# Patient Record
Sex: MALE | Race: WHITE | NOT HISPANIC OR LATINO
[De-identification: names, ages, dates, MRNs, and addresses within clinical notes are randomized per-mention and may not be internally consistent; named-entity substitution may affect disease eponyms.]

---

## 2019-10-01 ENCOUNTER — TRANSCRIPTION ENCOUNTER (OUTPATIENT)
Age: 79
End: 2019-10-01

## 2019-10-01 ENCOUNTER — INPATIENT (INPATIENT)
Facility: HOSPITAL | Age: 79
LOS: 7 days | Discharge: NURSING, REHAB OR LONG-TERM CARE | DRG: 467 | End: 2019-10-09
Attending: ORTHOPAEDIC SURGERY | Admitting: ORTHOPAEDIC SURGERY
Payer: MEDICARE

## 2019-10-01 VITALS
DIASTOLIC BLOOD PRESSURE: 81 MMHG | HEART RATE: 82 BPM | OXYGEN SATURATION: 98 % | RESPIRATION RATE: 17 BRPM | TEMPERATURE: 98 F | SYSTOLIC BLOOD PRESSURE: 121 MMHG

## 2019-10-01 DIAGNOSIS — S72.91XA UNSPECIFIED FRACTURE OF RIGHT FEMUR, INITIAL ENCOUNTER FOR CLOSED FRACTURE: ICD-10-CM

## 2019-10-01 LAB
ANION GAP SERPL CALC-SCNC: 10 MMOL/L — SIGNIFICANT CHANGE UP (ref 5–17)
APTT BLD: 27.3 SEC — LOW (ref 27.5–36.3)
BLD GP AB SCN SERPL QL: NEGATIVE — SIGNIFICANT CHANGE UP
BUN SERPL-MCNC: 18 MG/DL — SIGNIFICANT CHANGE UP (ref 7–23)
CALCIUM SERPL-MCNC: 9.4 MG/DL — SIGNIFICANT CHANGE UP (ref 8.4–10.5)
CHLORIDE SERPL-SCNC: 101 MMOL/L — SIGNIFICANT CHANGE UP (ref 96–108)
CO2 SERPL-SCNC: 26 MMOL/L — SIGNIFICANT CHANGE UP (ref 22–31)
CREAT SERPL-MCNC: 0.7 MG/DL — SIGNIFICANT CHANGE UP (ref 0.5–1.3)
GLUCOSE SERPL-MCNC: 102 MG/DL — HIGH (ref 70–99)
HCT VFR BLD CALC: 30.7 % — LOW (ref 39–50)
HGB BLD-MCNC: 10.5 G/DL — LOW (ref 13–17)
INR BLD: 1.04 RATIO — SIGNIFICANT CHANGE UP (ref 0.88–1.16)
MCHC RBC-ENTMCNC: 34 PG — SIGNIFICANT CHANGE UP (ref 27–34)
MCHC RBC-ENTMCNC: 34.2 GM/DL — SIGNIFICANT CHANGE UP (ref 32–36)
MCV RBC AUTO: 99.4 FL — SIGNIFICANT CHANGE UP (ref 80–100)
NRBC # BLD: 0 /100 WBCS — SIGNIFICANT CHANGE UP (ref 0–0)
PLATELET # BLD AUTO: 204 K/UL — SIGNIFICANT CHANGE UP (ref 150–400)
POTASSIUM SERPL-MCNC: 4.5 MMOL/L — SIGNIFICANT CHANGE UP (ref 3.5–5.3)
POTASSIUM SERPL-SCNC: 4.5 MMOL/L — SIGNIFICANT CHANGE UP (ref 3.5–5.3)
PROTHROM AB SERPL-ACNC: 11.9 SEC — SIGNIFICANT CHANGE UP (ref 10–12.9)
RBC # BLD: 3.09 M/UL — LOW (ref 4.2–5.8)
RBC # FLD: 13.4 % — SIGNIFICANT CHANGE UP (ref 10.3–14.5)
RH IG SCN BLD-IMP: POSITIVE — SIGNIFICANT CHANGE UP
SODIUM SERPL-SCNC: 137 MMOL/L — SIGNIFICANT CHANGE UP (ref 135–145)
WBC # BLD: 9.36 K/UL — SIGNIFICANT CHANGE UP (ref 3.8–10.5)
WBC # FLD AUTO: 9.36 K/UL — SIGNIFICANT CHANGE UP (ref 3.8–10.5)

## 2019-10-01 PROCEDURE — 76377 3D RENDER W/INTRP POSTPROCES: CPT | Mod: 26

## 2019-10-01 PROCEDURE — 73700 CT LOWER EXTREMITY W/O DYE: CPT | Mod: 26,LT

## 2019-10-01 PROCEDURE — 99221 1ST HOSP IP/OBS SF/LOW 40: CPT | Mod: AI,57

## 2019-10-01 PROCEDURE — 71045 X-RAY EXAM CHEST 1 VIEW: CPT | Mod: 26

## 2019-10-01 PROCEDURE — 93010 ELECTROCARDIOGRAM REPORT: CPT

## 2019-10-01 PROCEDURE — 73560 X-RAY EXAM OF KNEE 1 OR 2: CPT | Mod: 26,LT

## 2019-10-01 PROCEDURE — 73502 X-RAY EXAM HIP UNI 2-3 VIEWS: CPT | Mod: 26,LT

## 2019-10-01 PROCEDURE — 73552 X-RAY EXAM OF FEMUR 2/>: CPT | Mod: 26,LT

## 2019-10-01 RX ORDER — OXYCODONE HYDROCHLORIDE 5 MG/1
5 TABLET ORAL EVERY 4 HOURS
Refills: 0 | Status: DISCONTINUED | OUTPATIENT
Start: 2019-10-01 | End: 2019-10-02

## 2019-10-01 RX ORDER — ZOLPIDEM TARTRATE 10 MG/1
2.5 TABLET ORAL
Qty: 0 | Refills: 0 | DISCHARGE

## 2019-10-01 RX ORDER — ACETAMINOPHEN 500 MG
975 TABLET ORAL EVERY 8 HOURS
Refills: 0 | Status: DISCONTINUED | OUTPATIENT
Start: 2019-10-01 | End: 2019-10-02

## 2019-10-01 RX ORDER — SODIUM CHLORIDE 9 MG/ML
1000 INJECTION INTRAMUSCULAR; INTRAVENOUS; SUBCUTANEOUS
Refills: 0 | Status: DISCONTINUED | OUTPATIENT
Start: 2019-10-01 | End: 2019-10-02

## 2019-10-01 RX ORDER — SIMVASTATIN 20 MG/1
1 TABLET, FILM COATED ORAL
Qty: 0 | Refills: 0 | DISCHARGE

## 2019-10-01 RX ORDER — TRAMADOL HYDROCHLORIDE 50 MG/1
50 TABLET ORAL EVERY 6 HOURS
Refills: 0 | Status: DISCONTINUED | OUTPATIENT
Start: 2019-10-01 | End: 2019-10-02

## 2019-10-01 RX ORDER — SENNA PLUS 8.6 MG/1
2 TABLET ORAL AT BEDTIME
Refills: 0 | Status: DISCONTINUED | OUTPATIENT
Start: 2019-10-01 | End: 2019-10-02

## 2019-10-01 RX ORDER — LISINOPRIL 2.5 MG/1
1 TABLET ORAL
Qty: 0 | Refills: 0 | DISCHARGE

## 2019-10-01 RX ORDER — OXYCODONE HYDROCHLORIDE 5 MG/1
2.5 TABLET ORAL EVERY 4 HOURS
Refills: 0 | Status: DISCONTINUED | OUTPATIENT
Start: 2019-10-01 | End: 2019-10-02

## 2019-10-01 RX ADMIN — SENNA PLUS 2 TABLET(S): 8.6 TABLET ORAL at 22:27

## 2019-10-01 RX ADMIN — Medication 975 MILLIGRAM(S): at 22:57

## 2019-10-01 RX ADMIN — Medication 975 MILLIGRAM(S): at 22:27

## 2019-10-01 NOTE — CONSULT NOTE ADULT - SUBJECTIVE AND OBJECTIVE BOX
The patient was seen and examined tonight after an accidental fall with a left periprosthetic hip fracture that requires orthopedic ORIF. His comorbidities included inactive ASHD with stent 2010 and no post procedural symptoms, controlled hypercholesterolemia with medication, BPH ( possibly Ca. ) S/P cryosurgery 06/10/19, controlled hypertension, asbestosis, sleep apnea treated with CPAP and advanced age. Exam, lab, EKG and CXR are stable. The patient is medically stable, medically optimized and has no medical contraindication to surgery tomorrow as required. Exam time 70 minutes including > than 50 % for bedside discussion and counseling. Comprehensive consultation dictated #[id number].

## 2019-10-01 NOTE — H&P ADULT - HISTORY OF PRESENT ILLNESS
79y Male community ambulator w/o assistive devices presents as transfer from [facility] for periprosthetic femur fracture by L LENA. Patient fell off his roof onto cement floor, ~9 foot fall. Patient was unable to ambulate after fall. Went to urgent care on Friday 9/27 where xrays were negative. He was discharged to home. He subsequently fell again on 9/28 AM and remained bed bound until 10/1 when he presented by EMS to [facility]. Denies HS/LOC. Denies numbness/tingling. Denies fever/chills. Denies pain/injury elsewhere.     PAST MEDICAL & SURGICAL HISTORY:    MEDICATIONS  (STANDING):    Allergies      Intolerances        Vital Signs Last 24 Hrs  T(C): 36.7 (10-01-19 @ 18:03), Max: 36.7 (10-01-19 @ 18:03)  T(F): 98.1 (10-01-19 @ 18:03), Max: 98.1 (10-01-19 @ 18:03)  HR: 82 (10-01-19 @ 18:03) (82 - 82)  BP: 121/81 (10-01-19 @ 18:03) (121/81 - 121/81)  BP(mean): --  RR: 17 (10-01-19 @ 18:03) (17 - 17)  SpO2: 98% (10-01-19 @ 18:03) (98% - 98%)    Imaging: XR shows L [place] B2 fracture    Physical Exam  Gen: NAD  LLE: skin intact, ecchymosis over Left groin, short/ER leg, unable to SLR, + log roll, +ttp hip/groin, no ttp elsewhere, +ehl/fhl/ta/gs function, no calf ttp, dp/pt pulse intact, compartments soft  Secondary survey: benign, nv intact, able to SLR contralateral leg, negative log roll contralateral leg, no bony ttp elsewhere, bilateral upper extremity skin intact with no gross deformity, non tender to palpation over bony prominences, neurovascularly intact    A/P: 79y Male with L periprosthetic hip fracture  NPO after midnight, IVF  Pain control  NWB LLE, bedrest  FU labs/imaging/EKG/CXR  Needs Consent  Medical clearance/optimization for OR  Will discuss with attending

## 2019-10-02 ENCOUNTER — RESULT REVIEW (OUTPATIENT)
Age: 79
End: 2019-10-02

## 2019-10-02 LAB
ANION GAP SERPL CALC-SCNC: 7 MMOL/L — SIGNIFICANT CHANGE UP (ref 5–17)
ANION GAP SERPL CALC-SCNC: 9 MMOL/L — SIGNIFICANT CHANGE UP (ref 5–17)
APPEARANCE UR: CLEAR — SIGNIFICANT CHANGE UP
APTT BLD: 28.2 SEC — SIGNIFICANT CHANGE UP (ref 27.5–36.3)
BILIRUB UR-MCNC: NEGATIVE — SIGNIFICANT CHANGE UP
BLD GP AB SCN SERPL QL: NEGATIVE — SIGNIFICANT CHANGE UP
BUN SERPL-MCNC: 14 MG/DL — SIGNIFICANT CHANGE UP (ref 7–23)
BUN SERPL-MCNC: 18 MG/DL — SIGNIFICANT CHANGE UP (ref 7–23)
CALCIUM SERPL-MCNC: 8 MG/DL — LOW (ref 8.4–10.5)
CALCIUM SERPL-MCNC: 9.4 MG/DL — SIGNIFICANT CHANGE UP (ref 8.4–10.5)
CHLORIDE SERPL-SCNC: 100 MMOL/L — SIGNIFICANT CHANGE UP (ref 96–108)
CHLORIDE SERPL-SCNC: 101 MMOL/L — SIGNIFICANT CHANGE UP (ref 96–108)
CO2 SERPL-SCNC: 23 MMOL/L — SIGNIFICANT CHANGE UP (ref 22–31)
CO2 SERPL-SCNC: 28 MMOL/L — SIGNIFICANT CHANGE UP (ref 22–31)
COLOR SPEC: YELLOW — SIGNIFICANT CHANGE UP
CREAT SERPL-MCNC: 0.51 MG/DL — SIGNIFICANT CHANGE UP (ref 0.5–1.3)
CREAT SERPL-MCNC: 0.65 MG/DL — SIGNIFICANT CHANGE UP (ref 0.5–1.3)
DIFF PNL FLD: NEGATIVE — SIGNIFICANT CHANGE UP
GAS PNL BLDA: SIGNIFICANT CHANGE UP
GLUCOSE SERPL-MCNC: 106 MG/DL — HIGH (ref 70–99)
GLUCOSE SERPL-MCNC: 141 MG/DL — HIGH (ref 70–99)
GLUCOSE UR QL: NEGATIVE — SIGNIFICANT CHANGE UP
HCT VFR BLD CALC: 28.2 % — LOW (ref 39–50)
HCT VFR BLD CALC: 30.9 % — LOW (ref 39–50)
HGB BLD-MCNC: 10.7 G/DL — LOW (ref 13–17)
HGB BLD-MCNC: 9.8 G/DL — LOW (ref 13–17)
INR BLD: 1.06 RATIO — SIGNIFICANT CHANGE UP (ref 0.88–1.16)
KETONES UR-MCNC: NEGATIVE — SIGNIFICANT CHANGE UP
LEUKOCYTE ESTERASE UR-ACNC: NEGATIVE — SIGNIFICANT CHANGE UP
MCHC RBC-ENTMCNC: 34 PG — SIGNIFICANT CHANGE UP (ref 27–34)
MCHC RBC-ENTMCNC: 34.6 GM/DL — SIGNIFICANT CHANGE UP (ref 32–36)
MCHC RBC-ENTMCNC: 34.6 PG — HIGH (ref 27–34)
MCHC RBC-ENTMCNC: 34.8 GM/DL — SIGNIFICANT CHANGE UP (ref 32–36)
MCV RBC AUTO: 100 FL — SIGNIFICANT CHANGE UP (ref 80–100)
MCV RBC AUTO: 97.9 FL — SIGNIFICANT CHANGE UP (ref 80–100)
NITRITE UR-MCNC: NEGATIVE — SIGNIFICANT CHANGE UP
NRBC # BLD: 0 /100 WBCS — SIGNIFICANT CHANGE UP (ref 0–0)
NRBC # BLD: 0 /100 WBCS — SIGNIFICANT CHANGE UP (ref 0–0)
PH UR: 6 — SIGNIFICANT CHANGE UP (ref 5–8)
PLATELET # BLD AUTO: 194 K/UL — SIGNIFICANT CHANGE UP (ref 150–400)
PLATELET # BLD AUTO: 215 K/UL — SIGNIFICANT CHANGE UP (ref 150–400)
POTASSIUM SERPL-MCNC: 4.4 MMOL/L — SIGNIFICANT CHANGE UP (ref 3.5–5.3)
POTASSIUM SERPL-MCNC: 4.5 MMOL/L — SIGNIFICANT CHANGE UP (ref 3.5–5.3)
POTASSIUM SERPL-SCNC: 4.4 MMOL/L — SIGNIFICANT CHANGE UP (ref 3.5–5.3)
POTASSIUM SERPL-SCNC: 4.5 MMOL/L — SIGNIFICANT CHANGE UP (ref 3.5–5.3)
PROT UR-MCNC: NEGATIVE — SIGNIFICANT CHANGE UP
PROTHROM AB SERPL-ACNC: 12.1 SEC — SIGNIFICANT CHANGE UP (ref 10–12.9)
PSA FREE FLD-MCNC: <0.01 NG/ML — SIGNIFICANT CHANGE UP
PSA FREE FLD-MCNC: SIGNIFICANT CHANGE UP %
PSA SERPL-MCNC: 0.08 NG/ML — SIGNIFICANT CHANGE UP (ref 0–4)
RBC # BLD: 2.88 M/UL — LOW (ref 4.2–5.8)
RBC # BLD: 3.09 M/UL — LOW (ref 4.2–5.8)
RBC # FLD: 13.5 % — SIGNIFICANT CHANGE UP (ref 10.3–14.5)
RBC # FLD: 13.8 % — SIGNIFICANT CHANGE UP (ref 10.3–14.5)
RH IG SCN BLD-IMP: POSITIVE — SIGNIFICANT CHANGE UP
SODIUM SERPL-SCNC: 133 MMOL/L — LOW (ref 135–145)
SODIUM SERPL-SCNC: 135 MMOL/L — SIGNIFICANT CHANGE UP (ref 135–145)
SP GR SPEC: 1.02 — SIGNIFICANT CHANGE UP (ref 1.01–1.02)
UROBILINOGEN FLD QL: ABNORMAL
WBC # BLD: 16.12 K/UL — HIGH (ref 3.8–10.5)
WBC # BLD: 8.88 K/UL — SIGNIFICANT CHANGE UP (ref 3.8–10.5)
WBC # FLD AUTO: 16.12 K/UL — HIGH (ref 3.8–10.5)
WBC # FLD AUTO: 8.88 K/UL — SIGNIFICANT CHANGE UP (ref 3.8–10.5)

## 2019-10-02 PROCEDURE — 72170 X-RAY EXAM OF PELVIS: CPT | Mod: 26,59

## 2019-10-02 PROCEDURE — 27134 REVISE HIP JOINT REPLACEMENT: CPT | Mod: LT

## 2019-10-02 PROCEDURE — 27245 TREAT THIGH FRACTURE: CPT | Mod: LT

## 2019-10-02 PROCEDURE — 88300 SURGICAL PATH GROSS: CPT | Mod: 26

## 2019-10-02 PROCEDURE — 93010 ELECTROCARDIOGRAM REPORT: CPT | Mod: 76

## 2019-10-02 PROCEDURE — 73503 X-RAY EXAM HIP UNI 4/> VIEWS: CPT | Mod: 26,LT

## 2019-10-02 PROCEDURE — 73551 X-RAY EXAM OF FEMUR 1: CPT | Mod: 26,LT

## 2019-10-02 RX ORDER — ASPIRIN/CALCIUM CARB/MAGNESIUM 324 MG
325 TABLET ORAL
Refills: 0 | Status: DISCONTINUED | OUTPATIENT
Start: 2019-10-02 | End: 2019-10-09

## 2019-10-02 RX ORDER — KETOROLAC TROMETHAMINE 30 MG/ML
15 SYRINGE (ML) INJECTION EVERY 8 HOURS
Refills: 0 | Status: DISCONTINUED | OUTPATIENT
Start: 2019-10-02 | End: 2019-10-04

## 2019-10-02 RX ORDER — SODIUM CHLORIDE 9 MG/ML
1000 INJECTION, SOLUTION INTRAVENOUS
Refills: 0 | Status: DISCONTINUED | OUTPATIENT
Start: 2019-10-02 | End: 2019-10-09

## 2019-10-02 RX ORDER — HYDROMORPHONE HYDROCHLORIDE 2 MG/ML
0.5 INJECTION INTRAMUSCULAR; INTRAVENOUS; SUBCUTANEOUS
Refills: 0 | Status: DISCONTINUED | OUTPATIENT
Start: 2019-10-02 | End: 2019-10-02

## 2019-10-02 RX ORDER — SENNA PLUS 8.6 MG/1
2 TABLET ORAL AT BEDTIME
Refills: 0 | Status: DISCONTINUED | OUTPATIENT
Start: 2019-10-02 | End: 2019-10-09

## 2019-10-02 RX ORDER — DIPHENHYDRAMINE HCL 50 MG
25 CAPSULE ORAL EVERY 4 HOURS
Refills: 0 | Status: DISCONTINUED | OUTPATIENT
Start: 2019-10-02 | End: 2019-10-09

## 2019-10-02 RX ORDER — SODIUM CHLORIDE 9 MG/ML
500 INJECTION INTRAMUSCULAR; INTRAVENOUS; SUBCUTANEOUS ONCE
Refills: 0 | Status: COMPLETED | OUTPATIENT
Start: 2019-10-02 | End: 2019-10-02

## 2019-10-02 RX ORDER — DOCUSATE SODIUM 100 MG
100 CAPSULE ORAL THREE TIMES A DAY
Refills: 0 | Status: DISCONTINUED | OUTPATIENT
Start: 2019-10-02 | End: 2019-10-09

## 2019-10-02 RX ORDER — SIMVASTATIN 20 MG/1
40 TABLET, FILM COATED ORAL AT BEDTIME
Refills: 0 | Status: DISCONTINUED | OUTPATIENT
Start: 2019-10-02 | End: 2019-10-09

## 2019-10-02 RX ORDER — ONDANSETRON 8 MG/1
4 TABLET, FILM COATED ORAL ONCE
Refills: 0 | Status: DISCONTINUED | OUTPATIENT
Start: 2019-10-02 | End: 2019-10-02

## 2019-10-02 RX ORDER — OXYCODONE HYDROCHLORIDE 5 MG/1
5 TABLET ORAL EVERY 4 HOURS
Refills: 0 | Status: DISCONTINUED | OUTPATIENT
Start: 2019-10-02 | End: 2019-10-09

## 2019-10-02 RX ORDER — POLYETHYLENE GLYCOL 3350 17 G/17G
17 POWDER, FOR SOLUTION ORAL DAILY
Refills: 0 | Status: DISCONTINUED | OUTPATIENT
Start: 2019-10-02 | End: 2019-10-09

## 2019-10-02 RX ORDER — ASCORBIC ACID 60 MG
500 TABLET,CHEWABLE ORAL
Refills: 0 | Status: DISCONTINUED | OUTPATIENT
Start: 2019-10-02 | End: 2019-10-09

## 2019-10-02 RX ORDER — MAGNESIUM HYDROXIDE 400 MG/1
30 TABLET, CHEWABLE ORAL DAILY
Refills: 0 | Status: DISCONTINUED | OUTPATIENT
Start: 2019-10-02 | End: 2019-10-09

## 2019-10-02 RX ORDER — TRAMADOL HYDROCHLORIDE 50 MG/1
50 TABLET ORAL EVERY 6 HOURS
Refills: 0 | Status: DISCONTINUED | OUTPATIENT
Start: 2019-10-02 | End: 2019-10-07

## 2019-10-02 RX ORDER — LISINOPRIL 2.5 MG/1
10 TABLET ORAL DAILY
Refills: 0 | Status: DISCONTINUED | OUTPATIENT
Start: 2019-10-02 | End: 2019-10-09

## 2019-10-02 RX ORDER — FOLIC ACID 0.8 MG
1 TABLET ORAL DAILY
Refills: 0 | Status: DISCONTINUED | OUTPATIENT
Start: 2019-10-02 | End: 2019-10-09

## 2019-10-02 RX ORDER — FERROUS SULFATE 325(65) MG
325 TABLET ORAL
Refills: 0 | Status: DISCONTINUED | OUTPATIENT
Start: 2019-10-02 | End: 2019-10-09

## 2019-10-02 RX ORDER — ACETAMINOPHEN 500 MG
650 TABLET ORAL EVERY 6 HOURS
Refills: 0 | Status: COMPLETED | OUTPATIENT
Start: 2019-10-02 | End: 2019-10-05

## 2019-10-02 RX ORDER — PANTOPRAZOLE SODIUM 20 MG/1
40 TABLET, DELAYED RELEASE ORAL
Refills: 0 | Status: DISCONTINUED | OUTPATIENT
Start: 2019-10-02 | End: 2019-10-09

## 2019-10-02 RX ORDER — CEFAZOLIN SODIUM 1 G
2000 VIAL (EA) INJECTION EVERY 8 HOURS
Refills: 0 | Status: COMPLETED | OUTPATIENT
Start: 2019-10-02 | End: 2019-10-03

## 2019-10-02 RX ORDER — ONDANSETRON 8 MG/1
4 TABLET, FILM COATED ORAL EVERY 6 HOURS
Refills: 0 | Status: DISCONTINUED | OUTPATIENT
Start: 2019-10-02 | End: 2019-10-09

## 2019-10-02 RX ORDER — SODIUM CHLORIDE 9 MG/ML
500 INJECTION INTRAMUSCULAR; INTRAVENOUS; SUBCUTANEOUS ONCE
Refills: 0 | Status: COMPLETED | OUTPATIENT
Start: 2019-10-03 | End: 2019-10-03

## 2019-10-02 RX ORDER — OXYCODONE HYDROCHLORIDE 5 MG/1
10 TABLET ORAL EVERY 4 HOURS
Refills: 0 | Status: DISCONTINUED | OUTPATIENT
Start: 2019-10-02 | End: 2019-10-08

## 2019-10-02 RX ADMIN — HYDROMORPHONE HYDROCHLORIDE 0.5 MILLIGRAM(S): 2 INJECTION INTRAMUSCULAR; INTRAVENOUS; SUBCUTANEOUS at 21:30

## 2019-10-02 RX ADMIN — HYDROMORPHONE HYDROCHLORIDE 0.5 MILLIGRAM(S): 2 INJECTION INTRAMUSCULAR; INTRAVENOUS; SUBCUTANEOUS at 21:15

## 2019-10-02 RX ADMIN — Medication 975 MILLIGRAM(S): at 06:24

## 2019-10-02 RX ADMIN — OXYCODONE HYDROCHLORIDE 10 MILLIGRAM(S): 5 TABLET ORAL at 23:56

## 2019-10-02 RX ADMIN — Medication 650 MILLIGRAM(S): at 23:56

## 2019-10-02 RX ADMIN — OXYCODONE HYDROCHLORIDE 10 MILLIGRAM(S): 5 TABLET ORAL at 23:26

## 2019-10-02 RX ADMIN — SODIUM CHLORIDE 125 MILLILITER(S): 9 INJECTION INTRAMUSCULAR; INTRAVENOUS; SUBCUTANEOUS at 12:14

## 2019-10-02 RX ADMIN — Medication 15 MILLIGRAM(S): at 22:00

## 2019-10-02 RX ADMIN — SODIUM CHLORIDE 75 MILLILITER(S): 9 INJECTION, SOLUTION INTRAVENOUS at 21:52

## 2019-10-02 RX ADMIN — Medication 975 MILLIGRAM(S): at 05:54

## 2019-10-02 RX ADMIN — Medication 650 MILLIGRAM(S): at 23:26

## 2019-10-02 RX ADMIN — HYDROMORPHONE HYDROCHLORIDE 0.5 MILLIGRAM(S): 2 INJECTION INTRAMUSCULAR; INTRAVENOUS; SUBCUTANEOUS at 20:55

## 2019-10-02 RX ADMIN — Medication 100 MILLIGRAM(S): at 22:01

## 2019-10-02 RX ADMIN — Medication 15 MILLIGRAM(S): at 22:15

## 2019-10-02 RX ADMIN — SODIUM CHLORIDE 1000 MILLILITER(S): 9 INJECTION INTRAMUSCULAR; INTRAVENOUS; SUBCUTANEOUS at 21:45

## 2019-10-02 NOTE — PROGRESS NOTE ADULT - SUBJECTIVE AND OBJECTIVE BOX
Subjective: Patient seen and examined at bedside. Reports no acute complaints at this time. Pain is well controlled.    Objective:    Vital Signs Last 24 Hrs  T(C): 36.9 (02 Oct 2019 00:39), Max: 36.9 (02 Oct 2019 00:39)  T(F): 98.4 (02 Oct 2019 00:39), Max: 98.4 (02 Oct 2019 00:39)  HR: 69 (02 Oct 2019 00:39) (69 - 82)  BP: 133/78 (02 Oct 2019 00:39) (121/81 - 133/78)  BP(mean): --  RR: 17 (02 Oct 2019 00:39) (17 - 17)  SpO2: 98% (02 Oct 2019 00:39) (98% - 98%)    10-01    137  |  101  |  18  ----------------------------<  102<H>  4.5   |  26  |  0.70    Ca    9.4      01 Oct 2019 19:28                              10.7   8.88  )-----------( 215      ( 02 Oct 2019 05:37 )             30.9       PHYSICAL EXAM:  Gen: NAD   Left Lower Extremity:  +EHL/FHL/TA/GS  SILT L3-S1  +DP/PT Pulses

## 2019-10-02 NOTE — CHART NOTE - NSCHARTNOTEFT_GEN_A_CORE
Resting without complaints, nurses state he was agitated immediately postop.  No Chest Pain, SOB, N/V.    T(C): 36.7 (10-02-19 @ 22:00), Max: 36.9 (10-02-19 @ 00:39)  HR: 74 (10-02-19 @ 21:45) (68 - 93)  BP: 115/73 (10-02-19 @ 21:45) (103/78 - 133/78)  RR: 16 (10-02-19 @ 21:45) (14 - 18)  SpO2: 99% (10-02-19 @ 21:45) (96% - 100%)    Exam:  A&O x3 but rambling, no acute distress  Laterality: LLE Aquacel in place, C/D/I, L thigh ecchymotic anteriorly extending to the groin   Calves soft, non-tender bilaterally  +PF/DF/EHL/FHL  SILT  + DP pulse    Xray: in chart                          10.7   8.88  )-----------( 215      ( 02 Oct 2019 05:37 )             30.9    10-02    135  |  100  |  18  ----------------------------<  106<H>  4.4   |  28  |  0.65    Ca    9.4      02 Oct 2019 05:37        A/P: 79yMale S/p  L revision LENA. VSS. NAD    -PT/OT-WBAT LLE With Anterior Hip Precautions, OOB in AM  -IS encouraged  -DVT PPx with  mg BID  -Followup AM labs  -Pain Control  -PACU to floor    Doreen Servin PA-C  Team Pager #[phone] Resting without complaints, nurses state he was agitated immediately postop.  No Chest Pain, SOB, N/V.    T(C): 36.7 (10-02-19 @ 22:00), Max: 36.9 (10-02-19 @ 00:39)  HR: 74 (10-02-19 @ 21:45) (68 - 93)  BP: 115/73 (10-02-19 @ 21:45) (103/78 - 133/78)  RR: 16 (10-02-19 @ 21:45) (14 - 18)  SpO2: 99% (10-02-19 @ 21:45) (96% - 100%)    Exam:  A&O x3 but rambling, no acute distress  Laterality: LLE Aquacel in place, C/D/I, L thigh ecchymotic anteriorly extending to the groin   Calves soft, non-tender bilaterally  +PF/DF/EHL/FHL  SILT  + DP pulse    Xray: in chart                          10.7   8.88  )-----------( 215      ( 02 Oct 2019 05:37 )             30.9    10-02    135  |  100  |  18  ----------------------------<  106<H>  4.4   |  28  |  0.65    Ca    9.4      02 Oct 2019 05:37        A/P: 79yMale S/p  L revision LENA. VSS. NAD    -PT/OT-WBAT LLE with posterior hip precautions, OOB in AM  -IS encouraged  -DVT PPx with  mg BID  -Followup AM labs  -Pain Control  -PACU to floor    Doreen Servin PA-C  Team Pager #[phone] Resting without complaints, nurses state he was agitated immediately postop.  No Chest Pain, SOB, N/V.    T(C): 36.7 (10-02-19 @ 22:00), Max: 36.9 (10-02-19 @ 00:39)  HR: 74 (10-02-19 @ 21:45) (68 - 93)  BP: 115/73 (10-02-19 @ 21:45) (103/78 - 133/78)  RR: 16 (10-02-19 @ 21:45) (14 - 18)  SpO2: 99% (10-02-19 @ 21:45) (96% - 100%)    Exam:  A&O x3 but rambling, no acute distress  Laterality: LLE Aquacel in place, C/D/I, L thigh ecchymotic anteriorly extending to the groin   Calves soft, non-tender bilaterally  +PF/DF/EHL/FHL  SILT  + DP pulse    Xray: in chart                          10.7   8.88  )-----------( 215      ( 02 Oct 2019 05:37 )             30.9    10-02    135  |  100  |  18  ----------------------------<  106<H>  4.4   |  28  |  0.65    Ca    9.4      02 Oct 2019 05:37        A/P: 79yMale S/p  L revision LENA. VSS. NAD    -PT/OT-TTWB LLE with posterior hip precautions, OOB in AM  -IS encouraged  -DVT PPx with  mg BID  -Followup AM labs  -Pain Control  -PACU to floor    Doreen Servin PA-C  Team Pager #[phone]

## 2019-10-02 NOTE — PROGRESS NOTE ADULT - ASSESSMENT
A/P: 79y Male with L periprosthetic hip fracture  OR today at 4:30 with Dr. Corral  NPO   Pain control  NWB LLE, bedrest  FU labs/imaging/EKG/CXR  Medical clearance/optimization for OR pending

## 2019-10-02 NOTE — PROGRESS NOTE ADULT - SUBJECTIVE AND OBJECTIVE BOX
The patient was seen and examined tonight after an accidental fall with a left periprosthetic hip fracture that requires orthopedic ORIF. His comorbidities included inactive ASHD with stent  and no post procedural symptoms, controlled hypercholesterolemia with medication, BPH ( possibly Ca. ) S/P cryosurgery 06/10/19, controlled hypertension, asbestosis, sleep apnea treated with CPAPPatient is a 79y old  Male who presents with a chief complaint of Left periprosthetic hip fracture (02 Oct 2019 06:05)        MEDICATIONS  (STANDING):  acetaminophen   Tablet .. 650 milliGRAM(s) Oral every 6 hours  ascorbic acid 500 milliGRAM(s) Oral two times a day  aspirin enteric coated 325 milliGRAM(s) Oral two times a day  calcium carbonate 1250 mG  + Vitamin D (OsCal 500 + D) 1 Tablet(s) Oral three times a day  ceFAZolin   IVPB 2000 milliGRAM(s) IV Intermittent every 8 hours  docusate sodium 100 milliGRAM(s) Oral three times a day  ferrous    sulfate 325 milliGRAM(s) Oral three times a day with meals  folic acid 1 milliGRAM(s) Oral daily  ketorolac   Injectable 15 milliGRAM(s) IV Push every 8 hours  lactated ringers. 1000 milliLiter(s) (75 mL/Hr) IV Continuous <Continuous>  lisinopril 10 milliGRAM(s) Oral daily  multivitamin 1 Tablet(s) Oral daily  pantoprazole    Tablet 40 milliGRAM(s) Oral before breakfast  polyethylene glycol 3350 17 Gram(s) Oral daily  simvastatin 40 milliGRAM(s) Oral at bedtime  sodium chloride 0.9% Bolus 500 milliLiter(s) IV Bolus once    MEDICATIONS  (PRN):  aluminum hydroxide/magnesium hydroxide/simethicone Suspension 30 milliLiter(s) Oral four times a day PRN Indigestion  diphenhydrAMINE 25 milliGRAM(s) Oral every 4 hours PRN Rash and/or Itching  LORazepam   Injectable 1 milliGRAM(s) IV Push every 2 hours PRN CIWA-Ar score increase by 2 points and a total score of 7 or less  LORazepam   Injectable 1 milliGRAM(s) IV Push every 1 hour PRN CIWA-Ar score 8 or greater  LORazepam   Injectable 2 milliGRAM(s) IV Push every 2 hours PRN Symptom-triggered: 2 point increase in CIWA -Ar score and a total score of 7 or LESS  magnesium hydroxide Suspension 30 milliLiter(s) Oral daily PRN Constipation  ondansetron Injectable 4 milliGRAM(s) IV Push every 6 hours PRN Nausea and/or Vomiting  oxyCODONE    IR 5 milliGRAM(s) Oral every 4 hours PRN Moderate Pain (4 - 6)  oxyCODONE    IR 10 milliGRAM(s) Oral every 4 hours PRN Severe Pain (7 - 10)  senna 2 Tablet(s) Oral at bedtime PRN Constipation  traMADol 50 milliGRAM(s) Oral every 6 hours PRN Mild Pain (1 - 3)          VITALS:   T(C): 36.8 (10-02-19 @ 23:20), Max: 36.9 (10-02-19 @ 00:39)  HR: 75 (10-02-19 @ 23:20) (68 - 93)  BP: 121/69 (10-02-19 @ 23:20) (103/78 - 133/78)  RR: 18 (10-02-19 @ 23:20) (14 - 18)  SpO2: 96% (10-02-19 @ 23:20) (95% - 100%)  Wt(kg): --        LABS:  ABG - ( 02 Oct 2019 19:09 )  pH, Arterial: 7.37  pH, Blood: x     /  pCO2: 44    /  pO2: 181   / HCO3: 25    / Base Excess: -.3   /  SaO2: 99                    CBC Full  -  ( 02 Oct 2019 21:51 )  WBC Count : 16.12 K/uL  RBC Count : 2.88 M/uL  Hemoglobin : 9.8 g/dL  Hematocrit : 28.2 %  Platelet Count - Automated : 194 K/uL  Mean Cell Volume : 97.9 fl  Mean Cell Hemoglobin : 34.0 pg  Mean Cell Hemoglobin Concentration : 34.8 gm/dL  Auto Neutrophil # : x  Auto Lymphocyte # : x  Auto Monocyte # : x  Auto Eosinophil # : x  Auto Basophil # : x  Auto Neutrophil % : x  Auto Lymphocyte % : x  Auto Monocyte % : x  Auto Eosinophil % : x  Auto Basophil % : x    10-02    133<L>  |  101  |  14  ----------------------------<  141<H>  4.5   |  23  |  0.51    Ca    8.0<L>      02 Oct 2019 21:51        PT/INR - ( 02 Oct 2019 05:37 )   PT: 12.1 sec;   INR: 1.06 ratio         PTT - ( 02 Oct 2019 05:37 )  PTT:28.2 sec  Urinalysis Basic - ( 02 Oct 2019 00:16 )    Color: Yellow / Appearance: Clear / S.021 / pH: x  Gluc: x / Ketone: Negative  / Bili: Negative / Urobili: 12 mg/dL   Blood: x / Protein: Negative / Nitrite: Negative   Leuk Esterase: Negative / RBC: x / WBC x   Sq Epi: x / Non Sq Epi: x / Bacteria: x      CAPILLARY BLOOD GLUCOSE          RADIOLOGY & ADDITIONAL TESTS:

## 2019-10-02 NOTE — PROGRESS NOTE ADULT - ASSESSMENT
&* yo male presents after a fall. Found to have a right periprosthetic femur fx. Patient awaiting OR for Open reduction and internal fixation

## 2019-10-02 NOTE — PACU DISCHARGE NOTE - COMMENTS
PACU criteria met. DC to floor. care per primary team PACU criteria met. DC to floor. care per primary team. Continuos pulse ox, supplemental O2 PRN - patient refusing CPAP

## 2019-10-02 NOTE — BRIEF OPERATIVE NOTE - NSICDXBRIEFPROCEDURE_GEN_ALL_CORE_FT
PROCEDURES:  Open reduction and internal fixation of femur 02-Oct-2019 20:20:00  Goldy Trivedi  Major revision, total hip arthroplasty 02-Oct-2019 20:19:40  Goldy Trivedi

## 2019-10-03 DIAGNOSIS — N40.0 BENIGN PROSTATIC HYPERPLASIA WITHOUT LOWER URINARY TRACT SYMPTOMS: ICD-10-CM

## 2019-10-03 DIAGNOSIS — S72.92XA UNSPECIFIED FRACTURE OF LEFT FEMUR, INITIAL ENCOUNTER FOR CLOSED FRACTURE: ICD-10-CM

## 2019-10-03 DIAGNOSIS — I10 ESSENTIAL (PRIMARY) HYPERTENSION: ICD-10-CM

## 2019-10-03 LAB
ANION GAP SERPL CALC-SCNC: 9 MMOL/L — SIGNIFICANT CHANGE UP (ref 5–17)
BUN SERPL-MCNC: 15 MG/DL — SIGNIFICANT CHANGE UP (ref 7–23)
CALCIUM SERPL-MCNC: 8.4 MG/DL — SIGNIFICANT CHANGE UP (ref 8.4–10.5)
CHLORIDE SERPL-SCNC: 99 MMOL/L — SIGNIFICANT CHANGE UP (ref 96–108)
CO2 SERPL-SCNC: 24 MMOL/L — SIGNIFICANT CHANGE UP (ref 22–31)
CREAT SERPL-MCNC: 0.61 MG/DL — SIGNIFICANT CHANGE UP (ref 0.5–1.3)
GLUCOSE SERPL-MCNC: 128 MG/DL — HIGH (ref 70–99)
HCT VFR BLD CALC: 26.9 % — LOW (ref 39–50)
HGB BLD-MCNC: 8.7 G/DL — LOW (ref 13–17)
MCHC RBC-ENTMCNC: 32.3 GM/DL — SIGNIFICANT CHANGE UP (ref 32–36)
MCHC RBC-ENTMCNC: 32.8 PG — SIGNIFICANT CHANGE UP (ref 27–34)
MCV RBC AUTO: 101.5 FL — HIGH (ref 80–100)
PLATELET # BLD AUTO: 211 K/UL — SIGNIFICANT CHANGE UP (ref 150–400)
POTASSIUM SERPL-MCNC: 4.8 MMOL/L — SIGNIFICANT CHANGE UP (ref 3.5–5.3)
POTASSIUM SERPL-SCNC: 4.8 MMOL/L — SIGNIFICANT CHANGE UP (ref 3.5–5.3)
RBC # BLD: 2.65 M/UL — LOW (ref 4.2–5.8)
RBC # FLD: 14 % — SIGNIFICANT CHANGE UP (ref 10.3–14.5)
SODIUM SERPL-SCNC: 132 MMOL/L — LOW (ref 135–145)
WBC # BLD: 12.3 K/UL — HIGH (ref 3.8–10.5)
WBC # FLD AUTO: 12.3 K/UL — HIGH (ref 3.8–10.5)

## 2019-10-03 RX ADMIN — Medication 100 MILLIGRAM(S): at 21:51

## 2019-10-03 RX ADMIN — Medication 1 TABLET(S): at 14:36

## 2019-10-03 RX ADMIN — Medication 1 TABLET(S): at 21:50

## 2019-10-03 RX ADMIN — Medication 1 TABLET(S): at 05:56

## 2019-10-03 RX ADMIN — OXYCODONE HYDROCHLORIDE 10 MILLIGRAM(S): 5 TABLET ORAL at 22:21

## 2019-10-03 RX ADMIN — Medication 650 MILLIGRAM(S): at 05:55

## 2019-10-03 RX ADMIN — SODIUM CHLORIDE 1000 MILLILITER(S): 9 INJECTION INTRAMUSCULAR; INTRAVENOUS; SUBCUTANEOUS at 05:55

## 2019-10-03 RX ADMIN — Medication 15 MILLIGRAM(S): at 15:06

## 2019-10-03 RX ADMIN — Medication 650 MILLIGRAM(S): at 11:46

## 2019-10-03 RX ADMIN — Medication 325 MILLIGRAM(S): at 09:59

## 2019-10-03 RX ADMIN — Medication 15 MILLIGRAM(S): at 21:50

## 2019-10-03 RX ADMIN — Medication 500 MILLIGRAM(S): at 05:55

## 2019-10-03 RX ADMIN — Medication 100 MILLIGRAM(S): at 02:29

## 2019-10-03 RX ADMIN — OXYCODONE HYDROCHLORIDE 10 MILLIGRAM(S): 5 TABLET ORAL at 05:56

## 2019-10-03 RX ADMIN — Medication 650 MILLIGRAM(S): at 18:00

## 2019-10-03 RX ADMIN — Medication 15 MILLIGRAM(S): at 14:36

## 2019-10-03 RX ADMIN — Medication 100 MILLIGRAM(S): at 05:55

## 2019-10-03 RX ADMIN — Medication 1 MILLIGRAM(S): at 11:46

## 2019-10-03 RX ADMIN — Medication 325 MILLIGRAM(S): at 11:46

## 2019-10-03 RX ADMIN — Medication 15 MILLIGRAM(S): at 22:21

## 2019-10-03 RX ADMIN — SIMVASTATIN 40 MILLIGRAM(S): 20 TABLET, FILM COATED ORAL at 21:50

## 2019-10-03 RX ADMIN — OXYCODONE HYDROCHLORIDE 10 MILLIGRAM(S): 5 TABLET ORAL at 21:51

## 2019-10-03 RX ADMIN — Medication 15 MILLIGRAM(S): at 05:55

## 2019-10-03 RX ADMIN — Medication 650 MILLIGRAM(S): at 12:15

## 2019-10-03 RX ADMIN — Medication 500 MILLIGRAM(S): at 17:33

## 2019-10-03 RX ADMIN — Medication 100 MILLIGRAM(S): at 14:36

## 2019-10-03 RX ADMIN — POLYETHYLENE GLYCOL 3350 17 GRAM(S): 17 POWDER, FOR SOLUTION ORAL at 11:46

## 2019-10-03 RX ADMIN — PANTOPRAZOLE SODIUM 40 MILLIGRAM(S): 20 TABLET, DELAYED RELEASE ORAL at 05:56

## 2019-10-03 RX ADMIN — Medication 1 TABLET(S): at 11:46

## 2019-10-03 RX ADMIN — Medication 325 MILLIGRAM(S): at 05:55

## 2019-10-03 RX ADMIN — LISINOPRIL 10 MILLIGRAM(S): 2.5 TABLET ORAL at 05:56

## 2019-10-03 RX ADMIN — Medication 650 MILLIGRAM(S): at 17:33

## 2019-10-03 RX ADMIN — Medication 325 MILLIGRAM(S): at 17:33

## 2019-10-03 RX ADMIN — Medication 100 MILLIGRAM(S): at 09:59

## 2019-10-03 NOTE — PHYSICAL THERAPY INITIAL EVALUATION ADULT - PERTINENT HX OF CURRENT PROBLEM, REHAB EVAL
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  pt with difficulty weightbearing afterwards went to an urgent care and was sent home.  a few days later pt went to [facility] for ongoing hip pain. found to have a periprosthetic fx.  pt transferred to [facility] for management.  Pt is pod 1 s/p revision of R THR

## 2019-10-03 NOTE — PHYSICAL THERAPY INITIAL EVALUATION ADULT - ADDITIONAL COMMENTS
pt lives in a private home in [place] and he owns a vacation home in [place].  pt's home in [place] has a ramp. pt has a w/c, RW

## 2019-10-03 NOTE — PROVIDER CONTACT NOTE (OTHER) - ASSESSMENT
Pt resting in bed at this time. Spouse at bedside. Medicated for pain. Bryson catheter to bedside drainage with good output. Order states to remove Bryson within 24 hrs, PO day #1. Patient unable to work with PT yesterday post sx.

## 2019-10-03 NOTE — PHYSICAL THERAPY INITIAL EVALUATION ADULT - BALANCE TRAINING, PT EVAL
GOAL: Pt will improve balance during (static&dynamic) (sitting&standing) activites by atleast 1 balance grade within 3-4 weeks to assist with greater independence during functional mobility and ADL's.

## 2019-10-03 NOTE — PROGRESS NOTE ADULT - SUBJECTIVE AND OBJECTIVE BOX
Patient seen and examined. Pain controlled. No acute events overnight. on ciwa protocol.                            9.8    16.12 )-----------( 194      ( 02 Oct 2019 21:51 )             28.2     02 Oct 2019 21:51    133    |  101    |  14     ----------------------------<  141    4.5     |  23     |  0.51     Ca    8.0        02 Oct 2019 21:51      PT/INR - ( 02 Oct 2019 05:37 )   PT: 12.1 sec;   INR: 1.06 ratio         PTT - ( 02 Oct 2019 05:37 )  PTT:28.2 sec  Vital Signs Last 24 Hrs  T(C): 36.9 (10-03-19 @ 04:33), Max: 36.9 (10-03-19 @ 01:20)  T(F): 98.4 (10-03-19 @ 04:33), Max: 98.4 (10-03-19 @ 01:20)  HR: 72 (10-03-19 @ 05:54) (68 - 93)  BP: 117/66 (10-03-19 @ 05:54) (103/78 - 132/86)  BP(mean): 104 (10-02-19 @ 22:50) (75 - 105)  RR: 18 (10-03-19 @ 05:54) (14 - 18)  SpO2: 95% (10-03-19 @ 05:54) (94% - 100%)    Physical Exam  Gen: NAD  L LE:   Dressing c/d/i  +ehl/fhl/ta/gs function  L2-S1 silt  Dp/pt pulse intact  No calf ttp  Compartments soft

## 2019-10-03 NOTE — PROGRESS NOTE ADULT - SUBJECTIVE AND OBJECTIVE BOX
The patient was seen and examined tonight after an accidental fall with a left periprosthetic hip fracture that requires orthopedic ORIF. His comorbidities included inactive ASHD with stent  and no post procedural symptoms, controlled hypercholesterolemia with medication, BPH ( possibly Ca. ) S/P cryosurgery 06/10/19, controlled hypertension, asbestosis, sleep apnea treated with CPAPPatient is a 79y old  Male who presents with a chief complaint of Left periprosthetic hip fracture Patient seen s/p Open reduction and internal fixation of left periprostatic fx     MEDICATIONS  (STANDING):  acetaminophen   Tablet .. 650 milliGRAM(s) Oral every 6 hours  ascorbic acid 500 milliGRAM(s) Oral two times a day  aspirin enteric coated 325 milliGRAM(s) Oral two times a day  calcium carbonate 1250 mG  + Vitamin D (OsCal 500 + D) 1 Tablet(s) Oral three times a day  docusate sodium 100 milliGRAM(s) Oral three times a day  ferrous    sulfate 325 milliGRAM(s) Oral three times a day with meals  folic acid 1 milliGRAM(s) Oral daily  ketorolac   Injectable 15 milliGRAM(s) IV Push every 8 hours  lactated ringers. 1000 milliLiter(s) (75 mL/Hr) IV Continuous <Continuous>  lisinopril 10 milliGRAM(s) Oral daily  multivitamin 1 Tablet(s) Oral daily  pantoprazole    Tablet 40 milliGRAM(s) Oral before breakfast  polyethylene glycol 3350 17 Gram(s) Oral daily  simvastatin 40 milliGRAM(s) Oral at bedtime    MEDICATIONS  (PRN):  aluminum hydroxide/magnesium hydroxide/simethicone Suspension 30 milliLiter(s) Oral four times a day PRN Indigestion  diphenhydrAMINE 25 milliGRAM(s) Oral every 4 hours PRN Rash and/or Itching  LORazepam   Injectable 1 milliGRAM(s) IV Push every 2 hours PRN CIWA-Ar score increase by 2 points and a total score of 7 or less  LORazepam   Injectable 1 milliGRAM(s) IV Push every 1 hour PRN CIWA-Ar score 8 or greater  LORazepam   Injectable 2 milliGRAM(s) IV Push every 2 hours PRN Symptom-triggered: 2 point increase in CIWA -Ar score and a total score of 7 or LESS  magnesium hydroxide Suspension 30 milliLiter(s) Oral daily PRN Constipation  ondansetron Injectable 4 milliGRAM(s) IV Push every 6 hours PRN Nausea and/or Vomiting  oxyCODONE    IR 5 milliGRAM(s) Oral every 4 hours PRN Moderate Pain (4 - 6)  oxyCODONE    IR 10 milliGRAM(s) Oral every 4 hours PRN Severe Pain (7 - 10)  senna 2 Tablet(s) Oral at bedtime PRN Constipation  traMADol 50 milliGRAM(s) Oral every 6 hours PRN Mild Pain (1 - 3)          VITALS:   T(C): 36.9 (10-03-19 @ 20:00), Max: 36.9 (10-03-19 @ 01:20)  HR: 88 (10-03-19 @ 20:00) (71 - 95)  BP: 107/62 (10-03-19 @ 20:00) (106/67 - 121/69)  RR: 18 (10-03-19 @ 20:00) (16 - 18)  SpO2: 95% (10-03-19 @ 20:00) (94% - 98%)  Wt(kg): --    PHYSICAL EXAM:  GENERAL: NAD, well nourished and conversant  HEAD:  Atraumatic  EYES: EOM, PERRLA, conjunctiva pink and sclera white  ENT: No tonsillar erythema, exudates, or enlargement, moist mucous membranes, good dentition, no lesions  NECK: Supple, No JVD, normal thyroid, carotids with normal upstrokes and no bruits  CHEST/LUNG: Clear to auscultation bilaterally, No rales, rhonchi, wheezing, or rubs  HEART: Regular rate and rhythm, No murmurs, rubs, or gallops  ABDOMEN: Soft, nondistended, no masses, guarding, tenderness or rebound, bowel sounds present  EXTREMITIES:  2+ Peripheral Pulses, No clubbing, cyanosis, or edema. decreased ROM of left leg  LYMPH: No lymphadenopathy noted  SKIN: No rashes or lesions  NERVOUS SYSTEM:  Alert & Oriented X3, normal cognitive function. Motor Strength 5/5 right upper and right lower.  5/5 left upper and left lower extremities, DTRs 2+ intact and symmetric  Physic    LABS:  ABG - ( 02 Oct 2019 19:09 )  pH, Arterial: 7.37  pH, Blood: x     /  pCO2: 44    /  pO2: 181   / HCO3: 25    / Base Excess: -.3   /  SaO2: 99                    CBC Full  -  ( 03 Oct 2019 08:54 )  WBC Count : 12.30 K/uL  RBC Count : 2.65 M/uL  Hemoglobin : 8.7 g/dL  Hematocrit : 26.9 %  Platelet Count - Automated : 211 K/uL  Mean Cell Volume : 101.5 fl  Mean Cell Hemoglobin : 32.8 pg  Mean Cell Hemoglobin Concentration : 32.3 gm/dL  Auto Neutrophil # : x  Auto Lymphocyte # : x  Auto Monocyte # : x  Auto Eosinophil # : x  Auto Basophil # : x  Auto Neutrophil % : x  Auto Lymphocyte % : x  Auto Monocyte % : x  Auto Eosinophil % : x  Auto Basophil % : x    10-03    132<L>  |  99  |  15  ----------------------------<  128<H>  4.8   |  24  |  0.61    Ca    8.4      03 Oct 2019 07:12        PT/INR - ( 02 Oct 2019 05:37 )   PT: 12.1 sec;   INR: 1.06 ratio         PTT - ( 02 Oct 2019 05:37 )  PTT:28.2 sec  Urinalysis Basic - ( 02 Oct 2019 00:16 )    Color: Yellow / Appearance: Clear / S.021 / pH: x  Gluc: x / Ketone: Negative  / Bili: Negative / Urobili: 12 mg/dL   Blood: x / Protein: Negative / Nitrite: Negative   Leuk Esterase: Negative / RBC: x / WBC x   Sq Epi: x / Non Sq Epi: x / Bacteria: x      CAPILLARY BLOOD GLUCOSE          RADIOLOGY & ADDITIONAL TESTS:

## 2019-10-03 NOTE — PROGRESS NOTE ADULT - ASSESSMENT
A/P:  79yMale s/p revision L LENA POD1    Pain control  DVT ppx  LLE TTWB  DC resendez today  PT/Posterior hip precautions/abduction pillow/OOB  FU labs  Medical management appreciated  Incentive spirometry  Dispo planning

## 2019-10-04 LAB
ANION GAP SERPL CALC-SCNC: 6 MMOL/L — SIGNIFICANT CHANGE UP (ref 5–17)
BUN SERPL-MCNC: 17 MG/DL — SIGNIFICANT CHANGE UP (ref 7–23)
CALCIUM SERPL-MCNC: 8.6 MG/DL — SIGNIFICANT CHANGE UP (ref 8.4–10.5)
CHLORIDE SERPL-SCNC: 103 MMOL/L — SIGNIFICANT CHANGE UP (ref 96–108)
CO2 SERPL-SCNC: 25 MMOL/L — SIGNIFICANT CHANGE UP (ref 22–31)
CREAT SERPL-MCNC: 0.65 MG/DL — SIGNIFICANT CHANGE UP (ref 0.5–1.3)
GLUCOSE SERPL-MCNC: 101 MG/DL — HIGH (ref 70–99)
HCT VFR BLD CALC: 23.9 % — LOW (ref 39–50)
HGB BLD-MCNC: 7.9 G/DL — LOW (ref 13–17)
MCHC RBC-ENTMCNC: 33.1 GM/DL — SIGNIFICANT CHANGE UP (ref 32–36)
MCHC RBC-ENTMCNC: 33.5 PG — SIGNIFICANT CHANGE UP (ref 27–34)
MCV RBC AUTO: 101.3 FL — HIGH (ref 80–100)
PLATELET # BLD AUTO: 176 K/UL — SIGNIFICANT CHANGE UP (ref 150–400)
POTASSIUM SERPL-MCNC: 4.3 MMOL/L — SIGNIFICANT CHANGE UP (ref 3.5–5.3)
POTASSIUM SERPL-SCNC: 4.3 MMOL/L — SIGNIFICANT CHANGE UP (ref 3.5–5.3)
RBC # BLD: 2.36 M/UL — LOW (ref 4.2–5.8)
RBC # FLD: 14.4 % — SIGNIFICANT CHANGE UP (ref 10.3–14.5)
SODIUM SERPL-SCNC: 134 MMOL/L — LOW (ref 135–145)
WBC # BLD: 8.98 K/UL — SIGNIFICANT CHANGE UP (ref 3.8–10.5)
WBC # FLD AUTO: 8.98 K/UL — SIGNIFICANT CHANGE UP (ref 3.8–10.5)

## 2019-10-04 RX ADMIN — Medication 325 MILLIGRAM(S): at 17:11

## 2019-10-04 RX ADMIN — Medication 1 TABLET(S): at 13:51

## 2019-10-04 RX ADMIN — Medication 500 MILLIGRAM(S): at 06:09

## 2019-10-04 RX ADMIN — Medication 15 MILLIGRAM(S): at 06:39

## 2019-10-04 RX ADMIN — Medication 650 MILLIGRAM(S): at 14:20

## 2019-10-04 RX ADMIN — Medication 1 MILLIGRAM(S): at 13:51

## 2019-10-04 RX ADMIN — Medication 650 MILLIGRAM(S): at 13:51

## 2019-10-04 RX ADMIN — Medication 500 MILLIGRAM(S): at 17:11

## 2019-10-04 RX ADMIN — Medication 100 MILLIGRAM(S): at 06:10

## 2019-10-04 RX ADMIN — Medication 100 MILLIGRAM(S): at 22:33

## 2019-10-04 RX ADMIN — Medication 15 MILLIGRAM(S): at 06:09

## 2019-10-04 RX ADMIN — Medication 650 MILLIGRAM(S): at 06:39

## 2019-10-04 RX ADMIN — PANTOPRAZOLE SODIUM 40 MILLIGRAM(S): 20 TABLET, DELAYED RELEASE ORAL at 06:09

## 2019-10-04 RX ADMIN — POLYETHYLENE GLYCOL 3350 17 GRAM(S): 17 POWDER, FOR SOLUTION ORAL at 13:51

## 2019-10-04 RX ADMIN — SIMVASTATIN 40 MILLIGRAM(S): 20 TABLET, FILM COATED ORAL at 22:33

## 2019-10-04 RX ADMIN — Medication 1 TABLET(S): at 22:33

## 2019-10-04 RX ADMIN — Medication 1 TABLET(S): at 13:52

## 2019-10-04 RX ADMIN — Medication 325 MILLIGRAM(S): at 06:09

## 2019-10-04 RX ADMIN — Medication 100 MILLIGRAM(S): at 13:52

## 2019-10-04 RX ADMIN — Medication 15 MILLIGRAM(S): at 13:52

## 2019-10-04 RX ADMIN — Medication 650 MILLIGRAM(S): at 22:53

## 2019-10-04 RX ADMIN — Medication 325 MILLIGRAM(S): at 13:52

## 2019-10-04 RX ADMIN — Medication 650 MILLIGRAM(S): at 22:33

## 2019-10-04 RX ADMIN — Medication 15 MILLIGRAM(S): at 14:20

## 2019-10-04 RX ADMIN — Medication 650 MILLIGRAM(S): at 06:09

## 2019-10-04 RX ADMIN — Medication 1 TABLET(S): at 06:09

## 2019-10-04 NOTE — OCCUPATIONAL THERAPY INITIAL EVALUATION ADULT - RANGE OF MOTION EXAMINATION, LOWER EXTREMITY
L LE limited by posterior hip precautions/bilateral LE Active ROM was WFL  (within functional limits)

## 2019-10-04 NOTE — PROGRESS NOTE ADULT - SUBJECTIVE AND OBJECTIVE BOX
The patient was seen and examined tonight after an accidental fall with a left periprosthetic hip fracture that requires orthopedic ORIF. His comorbidities included inactive ASHD with stent  and no post procedural symptoms, controlled hypercholesterolemia with medication, BPH ( possibly Ca. ) S/P cryosurgery 06/10/19, controlled hypertension, asbestosis, sleep apnea treated with CPAPPatient is a 79y old  Male who presents with a chief complaint of Left periprosthetic hip fracture Patient seen s/p Open reduction and internal fixation of left periprostatic fx     MEDICATIONS  (STANDING):  acetaminophen   Tablet .. 650 milliGRAM(s) Oral every 6 hours  ascorbic acid 500 milliGRAM(s) Oral two times a day  aspirin enteric coated 325 milliGRAM(s) Oral two times a day  calcium carbonate 1250 mG  + Vitamin D (OsCal 500 + D) 1 Tablet(s) Oral three times a day  docusate sodium 100 milliGRAM(s) Oral three times a day  ferrous    sulfate 325 milliGRAM(s) Oral three times a day with meals  folic acid 1 milliGRAM(s) Oral daily  ketorolac   Injectable 15 milliGRAM(s) IV Push every 8 hours  lactated ringers. 1000 milliLiter(s) (75 mL/Hr) IV Continuous <Continuous>  lisinopril 10 milliGRAM(s) Oral daily  multivitamin 1 Tablet(s) Oral daily  pantoprazole    Tablet 40 milliGRAM(s) Oral before breakfast  polyethylene glycol 3350 17 Gram(s) Oral daily  simvastatin 40 milliGRAM(s) Oral at bedtime    MEDICATIONS  (PRN):  aluminum hydroxide/magnesium hydroxide/simethicone Suspension 30 milliLiter(s) Oral four times a day PRN Indigestion  diphenhydrAMINE 25 milliGRAM(s) Oral every 4 hours PRN Rash and/or Itching  LORazepam   Injectable 1 milliGRAM(s) IV Push every 2 hours PRN CIWA-Ar score increase by 2 points and a total score of 7 or less  LORazepam   Injectable 1 milliGRAM(s) IV Push every 1 hour PRN CIWA-Ar score 8 or greater  LORazepam   Injectable 2 milliGRAM(s) IV Push every 2 hours PRN Symptom-triggered: 2 point increase in CIWA -Ar score and a total score of 7 or LESS  magnesium hydroxide Suspension 30 milliLiter(s) Oral daily PRN Constipation  ondansetron Injectable 4 milliGRAM(s) IV Push every 6 hours PRN Nausea and/or Vomiting  oxyCODONE    IR 5 milliGRAM(s) Oral every 4 hours PRN Moderate Pain (4 - 6)  oxyCODONE    IR 10 milliGRAM(s) Oral every 4 hours PRN Severe Pain (7 - 10)  senna 2 Tablet(s) Oral at bedtime PRN Constipation  traMADol 50 milliGRAM(s) Oral every 6 hours PRN Mild Pain (1 - 3)      PT/INR - ( 02 Oct 2019 05:37 )   PT: 12.1 sec;   INR: 1.06 ratio         PTT - ( 02 Oct 2019 05:37 )  PTT:28.2 sec  Vital Signs Last 24 Hrs  T(C): 36.9 (10-03-19 @ 04:33), Max: 36.9 (10-03-19 @ 01:20)  T(F): 98.4 (10-03-19 @ 04:33), Max: 98.4 (10-03-19 @ 01:20)  HR: 72 (10-03-19 @ 05:54) (68 - 93)  BP: 117/66 (10-03-19 @ 05:54) (103/78 - 132/86)  BP(mean): 104 (10-02-19 @ 22:50) (75 - 105)  RR: 18 (10-03-19 @ 05:54) (14 - 18)  SpO2: 95% (10-03-19 @ 05:54) (94% - 100%)          PHYSICAL EXAM:  GENERAL: NAD, well nourished and conversant  HEAD:  Atraumatic  EYES: EOM, PERRLA, conjunctiva pink and sclera white  ENT: No tonsillar erythema, exudates, or enlargement, moist mucous membranes, good dentition, no lesions  NECK: Supple, No JVD, normal thyroid, carotids with normal upstrokes and no bruits  CHEST/LUNG: Clear to auscultation bilaterally, No rales, rhonchi, wheezing, or rubs  HEART: Regular rate and rhythm, No murmurs, rubs, or gallops  ABDOMEN: Soft, nondistended, no masses, guarding, tenderness or rebound, bowel sounds present  EXTREMITIES:  2+ Peripheral Pulses, No clubbing, cyanosis, or edema. decreased ROM of left leg  LYMPH: No lymphadenopathy noted  SKIN: No rashes or lesions  NERVOUS SYSTEM:  Alert & Oriented X3, normal cognitive function. Motor Strength 5/5 right upper and right lower.  5/5 left upper and left lower extremities, DTRs 2+ intact and symmetric  Physic    LABS:                    CBC Full  -  ( 04 Oct 2019 08:27 )  WBC Count : 8.98 K/uL  RBC Count : 2.36 M/uL  Hemoglobin : 7.9 g/dL  Hematocrit : 23.9 %  Platelet Count - Automated : 176 K/uL  Mean Cell Volume : 101.3 fl  Mean Cell Hemoglobin : 33.5 pg  Mean Cell Hemoglobin Concentration : 33.1 gm/dL  Auto Neutrophil # : x  Auto Lymphocyte # : x  Auto Monocyte # : x  Auto Eosinophil # : x  Auto Basophil # : x  Auto Neutrophil % : x  Auto Lymphocyte % : x  Auto Monocyte % : x  Auto Eosinophil % : x  Auto Basophil % : x    10-04    134<L>  |  103  |  17  ----------------------------<  101<H>  4.3   |  25  |  0.65    Ca    8.6      04 Oct 2019 06:32                  CBC Full  -  ( 03 Oct 2019 08:54 )  WBC Count : 12.30 K/uL  RBC Count : 2.65 M/uL  Hemoglobin : 8.7 g/dL  Hematocrit : 26.9 %  Platelet Count - Automated : 211 K/uL  Mean Cell Volume : 101.5 fl  Mean Cell Hemoglobin : 32.8 pg  Mean Cell Hemoglobin Concentration : 32.3 gm/dL  Auto Neutrophil # : x  Auto Lymphocyte # : x  Auto Monocyte # : x  Auto Eosinophil # : x  Auto Basophil # : x  Auto Neutrophil % : x  Auto Lymphocyte % : x  Auto Monocyte % : x  Auto Eosinophil % : x  Auto Basophil % : x    10-03    132<L>  |  99  |  15  ----------------------------<  128<H>  4.8   |  24  |  0.61    Ca    8.4      03 Oct 2019 07:12        PT/INR - ( 02 Oct 2019 05:37 )   PT: 12.1 sec;   INR: 1.06 ratio         PTT - ( 02 Oct 2019 05:37 )  PTT:28.2 sec  Urinalysis Basic - ( 02 Oct 2019 00:16 )    Color: Yellow / Appearance: Clear / S.021 / pH: x  Gluc: x / Ketone: Negative  / Bili: Negative / Urobili: 12 mg/dL   Blood: x / Protein: Negative / Nitrite: Negative   Leuk Esterase: Negative / RBC: x / WBC x   Sq Epi: x / Non Sq Epi: x / Bacteria: x      CAPILLARY BLOOD GLUCOSE          RADIOLOGY & ADDITIONAL TESTS:

## 2019-10-04 NOTE — OCCUPATIONAL THERAPY INITIAL EVALUATION ADULT - BALANCE DISTURBANCE, IDENTIFIED IMPAIRMENT CONTRIBUTE, REHAB EVAL
impaired coordination/impaired motor control/impaired postural control/decreased ROM/decreased strength

## 2019-10-04 NOTE — PROGRESS NOTE ADULT - ASSESSMENT
80 yo male presents after a fall. Found to have a right periprosthetic femur fx. Patient awaiting OR for Open reduction and internal fixation

## 2019-10-04 NOTE — OCCUPATIONAL THERAPY INITIAL EVALUATION ADULT - IMPAIRED TRANSFERS: SIT/STAND, REHAB EVAL
impaired coordination/decreased strength/ataxic/decreased ROM/impaired postural control/impaired balance

## 2019-10-04 NOTE — PROGRESS NOTE ADULT - ASSESSMENT
A/P:  79yMale s/p revision L LENA POD2    Pain control  DVT ppx  LLE TTWB  DC resendez today  PT/Posterior hip precautions/abduction pillow/OOB  FU labs  Medical management appreciated  Incentive spirometry  Dispo planning

## 2019-10-04 NOTE — OCCUPATIONAL THERAPY INITIAL EVALUATION ADULT - LIVES WITH, PROFILE
Pt lives in [place] in a house with his wife, +ramp to enter, +walk-in shower with chair and grab bars, +pt is a

## 2019-10-04 NOTE — OCCUPATIONAL THERAPY INITIAL EVALUATION ADULT - PERTINENT HX OF CURRENT PROBLEM, REHAB EVAL
79y Male transfer from [facility] for periprosthetic femur fracture by L LENA. Patient fell off his roof onto cement floor, ~9 foot fall. Went to urgent care on Friday 9/27 where xrays were negative. He was discharged to home. He subsequently fell again on 9/28 AM and remained bed bound until 10/1 when he presented by EMS to [facility]. Now s/p L LENA revision & cerclage cable 10/2.

## 2019-10-05 DIAGNOSIS — M97.9XXS: ICD-10-CM

## 2019-10-05 LAB
ANION GAP SERPL CALC-SCNC: 6 MMOL/L — SIGNIFICANT CHANGE UP (ref 5–17)
BUN SERPL-MCNC: 11 MG/DL — SIGNIFICANT CHANGE UP (ref 7–23)
CALCIUM SERPL-MCNC: 8.6 MG/DL — SIGNIFICANT CHANGE UP (ref 8.4–10.5)
CHLORIDE SERPL-SCNC: 99 MMOL/L — SIGNIFICANT CHANGE UP (ref 96–108)
CO2 SERPL-SCNC: 25 MMOL/L — SIGNIFICANT CHANGE UP (ref 22–31)
CREAT SERPL-MCNC: 0.55 MG/DL — SIGNIFICANT CHANGE UP (ref 0.5–1.3)
GLUCOSE SERPL-MCNC: 111 MG/DL — HIGH (ref 70–99)
HCT VFR BLD CALC: 23.6 % — LOW (ref 39–50)
HGB BLD-MCNC: 8 G/DL — LOW (ref 13–17)
MCHC RBC-ENTMCNC: 33.6 PG — SIGNIFICANT CHANGE UP (ref 27–34)
MCHC RBC-ENTMCNC: 33.9 GM/DL — SIGNIFICANT CHANGE UP (ref 32–36)
MCV RBC AUTO: 99.2 FL — SIGNIFICANT CHANGE UP (ref 80–100)
NRBC # BLD: 0 /100 WBCS — SIGNIFICANT CHANGE UP (ref 0–0)
PLATELET # BLD AUTO: 189 K/UL — SIGNIFICANT CHANGE UP (ref 150–400)
POTASSIUM SERPL-MCNC: 4.2 MMOL/L — SIGNIFICANT CHANGE UP (ref 3.5–5.3)
POTASSIUM SERPL-SCNC: 4.2 MMOL/L — SIGNIFICANT CHANGE UP (ref 3.5–5.3)
RBC # BLD: 2.38 M/UL — LOW (ref 4.2–5.8)
RBC # FLD: 14.4 % — SIGNIFICANT CHANGE UP (ref 10.3–14.5)
SODIUM SERPL-SCNC: 130 MMOL/L — LOW (ref 135–145)
WBC # BLD: 9.31 K/UL — SIGNIFICANT CHANGE UP (ref 3.8–10.5)
WBC # FLD AUTO: 9.31 K/UL — SIGNIFICANT CHANGE UP (ref 3.8–10.5)

## 2019-10-05 RX ADMIN — OXYCODONE HYDROCHLORIDE 5 MILLIGRAM(S): 5 TABLET ORAL at 18:29

## 2019-10-05 RX ADMIN — OXYCODONE HYDROCHLORIDE 10 MILLIGRAM(S): 5 TABLET ORAL at 21:59

## 2019-10-05 RX ADMIN — Medication 325 MILLIGRAM(S): at 17:46

## 2019-10-05 RX ADMIN — PANTOPRAZOLE SODIUM 40 MILLIGRAM(S): 20 TABLET, DELAYED RELEASE ORAL at 06:49

## 2019-10-05 RX ADMIN — Medication 650 MILLIGRAM(S): at 18:22

## 2019-10-05 RX ADMIN — SIMVASTATIN 40 MILLIGRAM(S): 20 TABLET, FILM COATED ORAL at 21:58

## 2019-10-05 RX ADMIN — Medication 1 TABLET(S): at 21:58

## 2019-10-05 RX ADMIN — Medication 100 MILLIGRAM(S): at 14:22

## 2019-10-05 RX ADMIN — Medication 650 MILLIGRAM(S): at 17:47

## 2019-10-05 RX ADMIN — Medication 1 MILLIGRAM(S): at 11:37

## 2019-10-05 RX ADMIN — POLYETHYLENE GLYCOL 3350 17 GRAM(S): 17 POWDER, FOR SOLUTION ORAL at 11:39

## 2019-10-05 RX ADMIN — Medication 1 TABLET(S): at 11:37

## 2019-10-05 RX ADMIN — Medication 1 TABLET(S): at 06:49

## 2019-10-05 RX ADMIN — LISINOPRIL 10 MILLIGRAM(S): 2.5 TABLET ORAL at 06:49

## 2019-10-05 RX ADMIN — Medication 650 MILLIGRAM(S): at 11:34

## 2019-10-05 RX ADMIN — Medication 500 MILLIGRAM(S): at 17:46

## 2019-10-05 RX ADMIN — OXYCODONE HYDROCHLORIDE 10 MILLIGRAM(S): 5 TABLET ORAL at 22:30

## 2019-10-05 RX ADMIN — Medication 1 TABLET(S): at 14:22

## 2019-10-05 RX ADMIN — Medication 325 MILLIGRAM(S): at 08:20

## 2019-10-05 RX ADMIN — Medication 325 MILLIGRAM(S): at 11:38

## 2019-10-05 RX ADMIN — OXYCODONE HYDROCHLORIDE 5 MILLIGRAM(S): 5 TABLET ORAL at 17:54

## 2019-10-05 RX ADMIN — Medication 650 MILLIGRAM(S): at 10:54

## 2019-10-05 RX ADMIN — Medication 100 MILLIGRAM(S): at 21:58

## 2019-10-05 RX ADMIN — Medication 325 MILLIGRAM(S): at 06:49

## 2019-10-05 RX ADMIN — Medication 500 MILLIGRAM(S): at 06:49

## 2019-10-05 RX ADMIN — Medication 100 MILLIGRAM(S): at 06:49

## 2019-10-05 RX ADMIN — Medication 650 MILLIGRAM(S): at 06:50

## 2019-10-05 RX ADMIN — Medication 650 MILLIGRAM(S): at 07:20

## 2019-10-05 NOTE — PROGRESS NOTE ADULT - SUBJECTIVE AND OBJECTIVE BOX
Patient is a 79y old  Male who presents with a chief complaint of Left periprosthetic hip fracture (04 Oct 2019 19:58)      POST OPERATIVE DAY #:  [3 ]   Patient comfortable in NAD  No complaints    VS:  T(C): 37.1 (10-05-19 @ 05:43), Max: 37.1 (10-05-19 @ 05:43)  T(F): 98.7 (10-05-19 @ 05:43), Max: 98.7 (10-05-19 @ 05:43)  HR: 81 (10-05-19 @ 05:43) (78 - 86)  BP: 123/69 (10-05-19 @ 05:43) (107/56 - 123/69)  RR: 18 (10-05-19 @ 05:43) (18 - 18)  SpO2: 95% (10-05-19 @ 05:43) (95% - 99%)  Wt(kg): --      PHYSICAL EXAM:  NAD, Alert  EXT:   [ ] Hip: Dressing C/D/I;   [x ] Aquacel Dressing reinforced  (+) surrounding ecchymosis noted  No Calf Tenderness  (+) DF/PF; (+) Distal Pulses; Still w/ uncontrolled / erratic motor movements   Sensation: No gross deficits noted  Pulses [1+  ]   B/L, PAS     LABS:                        7.9<L>  8.98  )-----------( 176      ( 04 Oct 2019 08:27 )             23.9<L>    10-04    134<L>  |  103  |  17  ----------------------------<  101<H>  4.3   |  25  |  0.65          RADIOLOGY & ADDITIONAL TESTS:

## 2019-10-05 NOTE — PROGRESS NOTE ADULT - PROBLEM SELECTOR PLAN 1
tolerated procedure well  Pain well controlled  Patient to start physical therapy  follow swelling of left thigh.  Follow H&H and transfuse as needed

## 2019-10-05 NOTE — PROGRESS NOTE ADULT - ASSESSMENT
82 yo male presents after a fall. Found to have a right periprosthetic femur fx. Patient awaiting OR for Open reduction and internal fixation

## 2019-10-05 NOTE — PROGRESS NOTE ADULT - SUBJECTIVE AND OBJECTIVE BOX
The patient was seen and examined tonight after an accidental fall with a left periprosthetic hip fracture that requires orthopedic ORIF. His comorbidities included inactive ASHD with stent 2010 and no post procedural symptoms, controlled hypercholesterolemia with medication, BPH ( possibly Ca. ) S/P cryosurgery 06/10/19, controlled hypertension, asbestosis, sleep apnea treated with CPAPPatient is a 79y old  Male who presents with a chief complaint of Left periprosthetic hip fracture Patient seen s/p Open reduction and internal fixation of left periprostatic fx patient tolerated procedure well.  Complaining of left thigh swelling.      MEDICATIONS  (STANDING):  ascorbic acid 500 milliGRAM(s) Oral two times a day  aspirin enteric coated 325 milliGRAM(s) Oral two times a day  calcium carbonate 1250 mG  + Vitamin D (OsCal 500 + D) 1 Tablet(s) Oral three times a day  docusate sodium 100 milliGRAM(s) Oral three times a day  ferrous    sulfate 325 milliGRAM(s) Oral three times a day with meals  folic acid 1 milliGRAM(s) Oral daily  lactated ringers. 1000 milliLiter(s) (75 mL/Hr) IV Continuous <Continuous>  lisinopril 10 milliGRAM(s) Oral daily  multivitamin 1 Tablet(s) Oral daily  pantoprazole    Tablet 40 milliGRAM(s) Oral before breakfast  polyethylene glycol 3350 17 Gram(s) Oral daily  simvastatin 40 milliGRAM(s) Oral at bedtime    MEDICATIONS  (PRN):  aluminum hydroxide/magnesium hydroxide/simethicone Suspension 30 milliLiter(s) Oral four times a day PRN Indigestion  bisacodyl Suppository 10 milliGRAM(s) Rectal daily PRN If no bowel movement by POD#2  diphenhydrAMINE 25 milliGRAM(s) Oral every 4 hours PRN Rash and/or Itching  LORazepam   Injectable 1 milliGRAM(s) IV Push every 2 hours PRN CIWA-Ar score increase by 2 points and a total score of 7 or less  LORazepam   Injectable 1 milliGRAM(s) IV Push every 1 hour PRN CIWA-Ar score 8 or greater  LORazepam   Injectable 2 milliGRAM(s) IV Push every 2 hours PRN Symptom-triggered: 2 point increase in CIWA -Ar score and a total score of 7 or LESS  magnesium hydroxide Suspension 30 milliLiter(s) Oral daily PRN Constipation  ondansetron Injectable 4 milliGRAM(s) IV Push every 6 hours PRN Nausea and/or Vomiting  oxyCODONE    IR 5 milliGRAM(s) Oral every 4 hours PRN Moderate Pain (4 - 6)  oxyCODONE    IR 10 milliGRAM(s) Oral every 4 hours PRN Severe Pain (7 - 10)  senna 2 Tablet(s) Oral at bedtime PRN Constipation  traMADol 50 milliGRAM(s) Oral every 6 hours PRN Mild Pain (1 - 3)          VITALS:   T(C): 36.9 (10-05-19 @ 16:02), Max: 37.1 (10-05-19 @ 05:43)  HR: 73 (10-05-19 @ 16:02) (73 - 86)  BP: 121/70 (10-05-19 @ 16:02) (115/70 - 144/82)  RR: 18 (10-05-19 @ 16:02) (18 - 18)  SpO2: 99% (10-05-19 @ 16:02) (95% - 99%)  Wt(kg): --      PHYSICAL EXAM:  GENERAL: NAD, well nourished and conversant  HEAD:  Atraumatic  EYES: EOM, PERRLA, conjunctiva pink and sclera white  ENT: No tonsillar erythema, exudates, or enlargement, moist mucous membranes, good dentition, no lesions  NECK: Supple, No JVD, normal thyroid, carotids with normal upstrokes and no bruits  CHEST/LUNG: Clear to auscultation bilaterally, No rales, rhonchi, wheezing, or rubs  HEART: Regular rate and rhythm, No murmurs, rubs, or gallops  ABDOMEN: Soft, nondistended, no masses, guarding, tenderness or rebound, bowel sounds present  EXTREMITIES:  2+ Peripheral Pulses, No clubbing, cyanosis, or edema. decreased ROM of left leg  LYMPH: No lymphadenopathy noted  SKIN: No rashes or lesions  NERVOUS SYSTEM:  Alert & Oriented X3, normal cognitive function. Motor Strength 5/5 right upper and right lower.  5/5 left upper and left lower extremities, DTRs 2+ intact and symmetric  LABS:        CBC Full  -  ( 05 Oct 2019 12:56 )  WBC Count : 9.31 K/uL  RBC Count : 2.38 M/uL  Hemoglobin : 8.0 g/dL  Hematocrit : 23.6 %  Platelet Count - Automated : 189 K/uL  Mean Cell Volume : 99.2 fl  Mean Cell Hemoglobin : 33.6 pg  Mean Cell Hemoglobin Concentration : 33.9 gm/dL  Auto Neutrophil # : x  Auto Lymphocyte # : x  Auto Monocyte # : x  Auto Eosinophil # : x  Auto Basophil # : x  Auto Neutrophil % : x  Auto Lymphocyte % : x  Auto Monocyte % : x  Auto Eosinophil % : x  Auto Basophil % : x    10-05    130<L>  |  99  |  11  ----------------------------<  111<H>  4.2   |  25  |  0.55    Ca    8.6      05 Oct 2019 12:56            CAPILLARY BLOOD GLUCOSE          RADIOLOGY & ADDITIONAL TESTS:

## 2019-10-05 NOTE — PROGRESS NOTE ADULT - ASSESSMENT
Assessment: s/p Revision L LENA    Plan:   PT: ANTHONY Gusman CIWA  ASA BID  dispo: anticipate rehab 10/7

## 2019-10-06 ENCOUNTER — TRANSCRIPTION ENCOUNTER (OUTPATIENT)
Age: 79
End: 2019-10-06

## 2019-10-06 LAB
ANION GAP SERPL CALC-SCNC: 7 MMOL/L — SIGNIFICANT CHANGE UP (ref 5–17)
BUN SERPL-MCNC: 10 MG/DL — SIGNIFICANT CHANGE UP (ref 7–23)
CALCIUM SERPL-MCNC: 8.8 MG/DL — SIGNIFICANT CHANGE UP (ref 8.4–10.5)
CHLORIDE SERPL-SCNC: 99 MMOL/L — SIGNIFICANT CHANGE UP (ref 96–108)
CO2 SERPL-SCNC: 26 MMOL/L — SIGNIFICANT CHANGE UP (ref 22–31)
CREAT SERPL-MCNC: 0.56 MG/DL — SIGNIFICANT CHANGE UP (ref 0.5–1.3)
GLUCOSE SERPL-MCNC: 101 MG/DL — HIGH (ref 70–99)
HCT VFR BLD CALC: 24.6 % — LOW (ref 39–50)
HGB BLD-MCNC: 8.4 G/DL — LOW (ref 13–17)
MCHC RBC-ENTMCNC: 34.1 GM/DL — SIGNIFICANT CHANGE UP (ref 32–36)
MCHC RBC-ENTMCNC: 34.1 PG — HIGH (ref 27–34)
MCV RBC AUTO: 100 FL — SIGNIFICANT CHANGE UP (ref 80–100)
NRBC # BLD: 0 /100 WBCS — SIGNIFICANT CHANGE UP (ref 0–0)
PLATELET # BLD AUTO: 224 K/UL — SIGNIFICANT CHANGE UP (ref 150–400)
POTASSIUM SERPL-MCNC: 4.3 MMOL/L — SIGNIFICANT CHANGE UP (ref 3.5–5.3)
POTASSIUM SERPL-SCNC: 4.3 MMOL/L — SIGNIFICANT CHANGE UP (ref 3.5–5.3)
RBC # BLD: 2.46 M/UL — LOW (ref 4.2–5.8)
RBC # FLD: 14.5 % — SIGNIFICANT CHANGE UP (ref 10.3–14.5)
SODIUM SERPL-SCNC: 132 MMOL/L — LOW (ref 135–145)
WBC # BLD: 9.02 K/UL — SIGNIFICANT CHANGE UP (ref 3.8–10.5)
WBC # FLD AUTO: 9.02 K/UL — SIGNIFICANT CHANGE UP (ref 3.8–10.5)

## 2019-10-06 RX ORDER — LANOLIN ALCOHOL/MO/W.PET/CERES
3 CREAM (GRAM) TOPICAL AT BEDTIME
Refills: 0 | Status: DISCONTINUED | OUTPATIENT
Start: 2019-10-06 | End: 2019-10-09

## 2019-10-06 RX ORDER — SENNA PLUS 8.6 MG/1
2 TABLET ORAL
Qty: 0 | Refills: 0 | DISCHARGE
Start: 2019-10-06

## 2019-10-06 RX ORDER — PANTOPRAZOLE SODIUM 20 MG/1
1 TABLET, DELAYED RELEASE ORAL
Qty: 0 | Refills: 0 | DISCHARGE
Start: 2019-10-06

## 2019-10-06 RX ORDER — ASCORBIC ACID 60 MG
1 TABLET,CHEWABLE ORAL
Qty: 0 | Refills: 0 | DISCHARGE
Start: 2019-10-06

## 2019-10-06 RX ORDER — FOLIC ACID 0.8 MG
1 TABLET ORAL
Qty: 0 | Refills: 0 | DISCHARGE
Start: 2019-10-06

## 2019-10-06 RX ORDER — ASPIRIN/CALCIUM CARB/MAGNESIUM 324 MG
1 TABLET ORAL
Qty: 0 | Refills: 0 | DISCHARGE

## 2019-10-06 RX ORDER — OXYCODONE HYDROCHLORIDE 5 MG/1
1 TABLET ORAL
Qty: 0 | Refills: 0 | DISCHARGE
Start: 2019-10-06

## 2019-10-06 RX ORDER — POLYETHYLENE GLYCOL 3350 17 G/17G
17 POWDER, FOR SOLUTION ORAL
Qty: 0 | Refills: 0 | DISCHARGE
Start: 2019-10-06

## 2019-10-06 RX ORDER — ASPIRIN/CALCIUM CARB/MAGNESIUM 324 MG
1 TABLET ORAL
Qty: 0 | Refills: 0 | DISCHARGE
Start: 2019-10-06 | End: 2019-11-13

## 2019-10-06 RX ORDER — DOCUSATE SODIUM 100 MG
1 CAPSULE ORAL
Qty: 0 | Refills: 0 | DISCHARGE
Start: 2019-10-06

## 2019-10-06 RX ORDER — TRAMADOL HYDROCHLORIDE 50 MG/1
1 TABLET ORAL
Qty: 0 | Refills: 0 | DISCHARGE
Start: 2019-10-06

## 2019-10-06 RX ORDER — MAGNESIUM HYDROXIDE 400 MG/1
30 TABLET, CHEWABLE ORAL
Qty: 0 | Refills: 0 | DISCHARGE
Start: 2019-10-06

## 2019-10-06 RX ORDER — DIPHENHYDRAMINE HCL 50 MG
1 CAPSULE ORAL
Qty: 0 | Refills: 0 | DISCHARGE
Start: 2019-10-06

## 2019-10-06 RX ADMIN — Medication 100 MILLIGRAM(S): at 21:22

## 2019-10-06 RX ADMIN — Medication 100 MILLIGRAM(S): at 06:24

## 2019-10-06 RX ADMIN — PANTOPRAZOLE SODIUM 40 MILLIGRAM(S): 20 TABLET, DELAYED RELEASE ORAL at 06:24

## 2019-10-06 RX ADMIN — Medication 1 TABLET(S): at 12:07

## 2019-10-06 RX ADMIN — Medication 325 MILLIGRAM(S): at 06:25

## 2019-10-06 RX ADMIN — Medication 325 MILLIGRAM(S): at 17:58

## 2019-10-06 RX ADMIN — Medication 500 MILLIGRAM(S): at 17:58

## 2019-10-06 RX ADMIN — OXYCODONE HYDROCHLORIDE 10 MILLIGRAM(S): 5 TABLET ORAL at 12:08

## 2019-10-06 RX ADMIN — Medication 1 MILLIGRAM(S): at 12:08

## 2019-10-06 RX ADMIN — OXYCODONE HYDROCHLORIDE 10 MILLIGRAM(S): 5 TABLET ORAL at 17:58

## 2019-10-06 RX ADMIN — Medication 100 MILLIGRAM(S): at 12:08

## 2019-10-06 RX ADMIN — Medication 1 TABLET(S): at 12:08

## 2019-10-06 RX ADMIN — Medication 500 MILLIGRAM(S): at 06:25

## 2019-10-06 RX ADMIN — OXYCODONE HYDROCHLORIDE 10 MILLIGRAM(S): 5 TABLET ORAL at 12:38

## 2019-10-06 RX ADMIN — Medication 1 TABLET(S): at 21:22

## 2019-10-06 RX ADMIN — POLYETHYLENE GLYCOL 3350 17 GRAM(S): 17 POWDER, FOR SOLUTION ORAL at 12:08

## 2019-10-06 RX ADMIN — LISINOPRIL 10 MILLIGRAM(S): 2.5 TABLET ORAL at 06:24

## 2019-10-06 RX ADMIN — Medication 325 MILLIGRAM(S): at 12:07

## 2019-10-06 RX ADMIN — OXYCODONE HYDROCHLORIDE 10 MILLIGRAM(S): 5 TABLET ORAL at 18:28

## 2019-10-06 RX ADMIN — SIMVASTATIN 40 MILLIGRAM(S): 20 TABLET, FILM COATED ORAL at 21:22

## 2019-10-06 RX ADMIN — Medication 1 TABLET(S): at 06:24

## 2019-10-06 NOTE — DISCHARGE NOTE PROVIDER - CARE PROVIDERS DIRECT ADDRESSES
[email or web address]

## 2019-10-06 NOTE — DISCHARGE NOTE PROVIDER - NSDCFUADDAPPT_GEN_ALL_CORE_FT
Patient to follow up with Dr. Corral once discharged from [facility] for follow up care.  Please call [phone] to schedule appointment.    Please follow up with your primary care provider within one month of discharge from rehab for continuum of care.

## 2019-10-06 NOTE — PROGRESS NOTE ADULT - ASSESSMENT
Assessment: s/p Open Reduction Internal Fixation / Surgical Repair L [place]-prosthetic Fx    Plan:   PT: TTWB  Ice L hip  Monitor  for possible rehab 10/7

## 2019-10-06 NOTE — DISCHARGE NOTE PROVIDER - CARE PROVIDER_API CALL
Eloise Corral)  Orthopaedic Surgery  [address], [address]  [place] [address]  Phone: [phone]  Fax: [phone]  Follow Up Time:     Jose Dobbins (DO)  [facility]  [address] [id number]  Phone: [phone]  Fax: [phone]  Follow Up Time:

## 2019-10-06 NOTE — DISCHARGE NOTE PROVIDER - NSDCFUADDINST_GEN_ALL_CORE_FT
Dressing to be removed on post operative day#14 and sutures/staples to be removed if applicable by rehab staff.    PT/OT: ANTHONY RAZO Dressing should be removed POD# 14 (If staples or sutures present d/c and apply steri strips). Out of bed, ambulate, toe touch weight bearing til changed by surgeon- Physical therapy to assist with exercise and help increase endurance.   Patient may shower, limit direct water to dressing, if wet pat dry

## 2019-10-06 NOTE — DISCHARGE NOTE PROVIDER - NSDCACTIVITY_GEN_ALL_CORE
Do not make important decisions/Stairs allowed/Do not drive or operate machinery/Patient requires assist with all standing and ambulating activities secondary to loss of equilibrium./Walking - Outdoors allowed/Walking - Indoors allowed/No heavy lifting/straining

## 2019-10-06 NOTE — PROGRESS NOTE ADULT - SUBJECTIVE AND OBJECTIVE BOX
Patient is a 79y old  Male who presents with a chief complaint of Left periprosthetic hip fracture (04 Oct 2019 19:58)      POST OPERATIVE DAY #:  [4 ]   Patient comfortable in NAD  No complaints    ICU Vital Signs Last 24 Hrs  T(C): 36.6 (06 Oct 2019 04:56), Max: 37.1 (05 Oct 2019 13:14)  T(F): 97.8 (06 Oct 2019 04:56), Max: 98.8 (05 Oct 2019 13:14)  HR: 74 (06 Oct 2019 04:56) (72 - 81)  BP: 139/67 (06 Oct 2019 04:56) (103/64 - 144/82)  BP(mean): --  ABP: --  ABP(mean): --  RR: 18 (06 Oct 2019 04:56) (18 - 18)  SpO2: 97% (06 Oct 2019 04:56) (96% - 99%)        PHYSICAL EXAM:  NAD, Alert  EXT:   LLE  [x ] Aquacel Dressing  C/D  (+) surrounding ecchymosis & swelling noted  No Calf Tenderness  (+) DF/PF; (+) Distal Pulses; Still w/ uncontrolled / erratic motor movements   Sensation: No gross deficits noted  Pulses [1+  ]   B/L, PAS     LABS:                        7.9<L>  8.98  )-----------( 176      ( 04 Oct 2019 08:27 )             23.9<L>    10-04    134<L>  |  103  |  17  ----------------------------<  101<H>  4.3   |  25  |  0.65          RADIOLOGY & ADDITIONAL TESTS:

## 2019-10-06 NOTE — PROGRESS NOTE ADULT - PROBLEM SELECTOR PLAN 1
Pain well controlled  continue physical therapy as tolerated  follow swelling of left thigh.  Follow H&H and transfuse as needed

## 2019-10-06 NOTE — DISCHARGE NOTE PROVIDER - NSDCCPCAREPLAN_GEN_ALL_CORE_FT
PRINCIPAL DISCHARGE DIAGNOSIS  Diagnosis: Periprosthetic fracture around internal prosthetic joint, sequela  Assessment and Plan of Treatment: Periprosthetic fracture around internal prosthetic joint, sequela

## 2019-10-06 NOTE — PROGRESS NOTE ADULT - SUBJECTIVE AND OBJECTIVE BOX
The patient was seen and examined tonight after an accidental fall with a left periprosthetic hip fracture that requires orthopedic ORIF. His comorbidities included inactive ASHD with stent 2010 and no post procedural symptoms, controlled hypercholesterolemia with medication, BPH ( possibly Ca. ) S/P cryosurgery 06/10/19, controlled hypertension, asbestosis, sleep apnea treated with CPAPPatient is a 79y old  Male who presents with a chief complaint of Left periprosthetic hip fracture Patient seen s/p Open reduction and internal fixation of left periprostatic fx patient tolerated procedure well.  Patient states pain has been well controlled. Slight swelling of left thigh      MEDICATIONS  (STANDING):  ascorbic acid 500 milliGRAM(s) Oral two times a day  aspirin enteric coated 325 milliGRAM(s) Oral two times a day  calcium carbonate 1250 mG  + Vitamin D (OsCal 500 + D) 1 Tablet(s) Oral three times a day  docusate sodium 100 milliGRAM(s) Oral three times a day  ferrous    sulfate 325 milliGRAM(s) Oral three times a day with meals  folic acid 1 milliGRAM(s) Oral daily  lactated ringers. 1000 milliLiter(s) (75 mL/Hr) IV Continuous <Continuous>  lisinopril 10 milliGRAM(s) Oral daily  multivitamin 1 Tablet(s) Oral daily  pantoprazole    Tablet 40 milliGRAM(s) Oral before breakfast  polyethylene glycol 3350 17 Gram(s) Oral daily  simvastatin 40 milliGRAM(s) Oral at bedtime    MEDICATIONS  (PRN):  aluminum hydroxide/magnesium hydroxide/simethicone Suspension 30 milliLiter(s) Oral four times a day PRN Indigestion  bisacodyl Suppository 10 milliGRAM(s) Rectal daily PRN If no bowel movement by POD#2  diphenhydrAMINE 25 milliGRAM(s) Oral every 4 hours PRN Rash and/or Itching  magnesium hydroxide Suspension 30 milliLiter(s) Oral daily PRN Constipation  ondansetron Injectable 4 milliGRAM(s) IV Push every 6 hours PRN Nausea and/or Vomiting  oxyCODONE    IR 5 milliGRAM(s) Oral every 4 hours PRN Moderate Pain (4 - 6)  oxyCODONE    IR 10 milliGRAM(s) Oral every 4 hours PRN Severe Pain (7 - 10)  senna 2 Tablet(s) Oral at bedtime PRN Constipation  traMADol 50 milliGRAM(s) Oral every 6 hours PRN Mild Pain (1 - 3)          VITALS:   T(C): 37.6 (10-06-19 @ 20:07), Max: 37.6 (10-06-19 @ 20:07)  HR: 90 (10-06-19 @ 20:07) (71 - 90)  BP: 102/67 (10-06-19 @ 20:07) (102/65 - 139/67)  RR: 16 (10-06-19 @ 20:07) (16 - 18)  SpO2: 99% (10-06-19 @ 20:07) (96% - 99%)  Wt(kg): --    HYSICAL EXAM:  GENERAL: NAD, well nourished and conversant  HEAD:  Atraumatic  EYES: EOM, PERRLA, conjunctiva pink and sclera white  ENT: No tonsillar erythema, exudates, or enlargement, moist mucous membranes, good dentition, no lesions  NECK: Supple, No JVD, normal thyroid, carotids with normal upstrokes and no bruits  CHEST/LUNG: Clear to auscultation bilaterally, No rales, rhonchi, wheezing, or rubs  HEART: Regular rate and rhythm, No murmurs, rubs, or gallops  ABDOMEN: Soft, nondistended, no masses, guarding, tenderness or rebound, bowel sounds present  EXTREMITIES:  2+ Peripheral Pulses, swelling of left thigh  LYMPH: No lymphadenopathy noted  SKIN: No rashes or lesions  NERVOUS SYSTEM:  Alert & Oriented X3,. Motor Strength 5/5 right upper and right lower.  5/5 left upper and left lowe    LABS:        CBC Full  -  ( 06 Oct 2019 06:42 )  WBC Count : 9.02 K/uL  RBC Count : 2.46 M/uL  Hemoglobin : 8.4 g/dL  Hematocrit : 24.6 %  Platelet Count - Automated : 224 K/uL  Mean Cell Volume : 100.0 fl  Mean Cell Hemoglobin : 34.1 pg  Mean Cell Hemoglobin Concentration : 34.1 gm/dL  Auto Neutrophil # : x  Auto Lymphocyte # : x  Auto Monocyte # : x  Auto Eosinophil # : x  Auto Basophil # : x  Auto Neutrophil % : x  Auto Lymphocyte % : x  Auto Monocyte % : x  Auto Eosinophil % : x  Auto Basophil % : x    10-06    132<L>  |  99  |  10  ----------------------------<  101<H>  4.3   |  26  |  0.56    Ca    8.8      06 Oct 2019 06:42            CAPILLARY BLOOD GLUCOSE          RADIOLOGY & ADDITIONAL TESTS:

## 2019-10-06 NOTE — DISCHARGE NOTE PROVIDER - HOSPITAL COURSE
History of Present Illness:     79y Male community ambulator w/o assistive devices presents as transfer from [facility] for periprosthetic femur fracture by L LENA. Patient fell off his roof onto cement floor, ~9 foot fall. Patient was unable to ambulate after fall. Went to urgent care on Friday 9/27 where xrays were negative. He was discharged to home. He subsequently fell again on 9/28 AM and remained bed bound until 10/1 when he presented by EMS to [facility]. Denies HS/LOC. Denies numbness/tingling. Denies fever/chills. Denies pain/injury elsewhere.        Hospital Course:    Patient admitted to [facility] on 10/1/19 for L Hip Fx and scheduled for a Revision Total Hip Arhtroplasty with Dr. Corral on 10/2/19 with no complications during procedure.  Patient evaluated and treated by Physical Therapy who recommendations were for disposition to [facility] Rehab.  Patient with no complication with the remainder of hospital stay.

## 2019-10-07 RX ORDER — SODIUM CHLORIDE 9 MG/ML
1000 INJECTION INTRAMUSCULAR; INTRAVENOUS; SUBCUTANEOUS ONCE
Refills: 0 | Status: COMPLETED | OUTPATIENT
Start: 2019-10-07 | End: 2019-10-07

## 2019-10-07 RX ORDER — SODIUM CHLORIDE 9 MG/ML
1000 INJECTION INTRAMUSCULAR; INTRAVENOUS; SUBCUTANEOUS
Refills: 0 | Status: DISCONTINUED | OUTPATIENT
Start: 2019-10-07 | End: 2019-10-09

## 2019-10-07 RX ADMIN — OXYCODONE HYDROCHLORIDE 10 MILLIGRAM(S): 5 TABLET ORAL at 22:42

## 2019-10-07 RX ADMIN — Medication 100 MILLIGRAM(S): at 22:15

## 2019-10-07 RX ADMIN — OXYCODONE HYDROCHLORIDE 10 MILLIGRAM(S): 5 TABLET ORAL at 14:30

## 2019-10-07 RX ADMIN — Medication 1 TABLET(S): at 11:49

## 2019-10-07 RX ADMIN — TRAMADOL HYDROCHLORIDE 50 MILLIGRAM(S): 50 TABLET ORAL at 06:15

## 2019-10-07 RX ADMIN — Medication 325 MILLIGRAM(S): at 05:47

## 2019-10-07 RX ADMIN — Medication 325 MILLIGRAM(S): at 08:36

## 2019-10-07 RX ADMIN — Medication 1 TABLET(S): at 22:15

## 2019-10-07 RX ADMIN — OXYCODONE HYDROCHLORIDE 10 MILLIGRAM(S): 5 TABLET ORAL at 12:57

## 2019-10-07 RX ADMIN — OXYCODONE HYDROCHLORIDE 10 MILLIGRAM(S): 5 TABLET ORAL at 09:10

## 2019-10-07 RX ADMIN — Medication 3 MILLIGRAM(S): at 00:42

## 2019-10-07 RX ADMIN — Medication 100 MILLIGRAM(S): at 05:47

## 2019-10-07 RX ADMIN — PANTOPRAZOLE SODIUM 40 MILLIGRAM(S): 20 TABLET, DELAYED RELEASE ORAL at 05:47

## 2019-10-07 RX ADMIN — Medication 500 MILLIGRAM(S): at 05:47

## 2019-10-07 RX ADMIN — Medication 1 TABLET(S): at 14:30

## 2019-10-07 RX ADMIN — OXYCODONE HYDROCHLORIDE 10 MILLIGRAM(S): 5 TABLET ORAL at 08:40

## 2019-10-07 RX ADMIN — Medication 325 MILLIGRAM(S): at 17:27

## 2019-10-07 RX ADMIN — Medication 500 MILLIGRAM(S): at 17:27

## 2019-10-07 RX ADMIN — LISINOPRIL 10 MILLIGRAM(S): 2.5 TABLET ORAL at 05:47

## 2019-10-07 RX ADMIN — SODIUM CHLORIDE 100 MILLILITER(S): 9 INJECTION INTRAMUSCULAR; INTRAVENOUS; SUBCUTANEOUS at 15:57

## 2019-10-07 RX ADMIN — OXYCODONE HYDROCHLORIDE 10 MILLIGRAM(S): 5 TABLET ORAL at 22:13

## 2019-10-07 RX ADMIN — OXYCODONE HYDROCHLORIDE 10 MILLIGRAM(S): 5 TABLET ORAL at 01:15

## 2019-10-07 RX ADMIN — Medication 1 TABLET(S): at 05:47

## 2019-10-07 RX ADMIN — Medication 1 MILLIGRAM(S): at 11:49

## 2019-10-07 RX ADMIN — Medication 100 MILLIGRAM(S): at 14:30

## 2019-10-07 RX ADMIN — Medication 325 MILLIGRAM(S): at 11:49

## 2019-10-07 RX ADMIN — SODIUM CHLORIDE 1000 MILLILITER(S): 9 INJECTION INTRAMUSCULAR; INTRAVENOUS; SUBCUTANEOUS at 15:57

## 2019-10-07 RX ADMIN — SIMVASTATIN 40 MILLIGRAM(S): 20 TABLET, FILM COATED ORAL at 22:15

## 2019-10-07 RX ADMIN — TRAMADOL HYDROCHLORIDE 50 MILLIGRAM(S): 50 TABLET ORAL at 05:47

## 2019-10-07 RX ADMIN — Medication 3 MILLIGRAM(S): at 22:15

## 2019-10-07 RX ADMIN — POLYETHYLENE GLYCOL 3350 17 GRAM(S): 17 POWDER, FOR SOLUTION ORAL at 11:49

## 2019-10-07 RX ADMIN — OXYCODONE HYDROCHLORIDE 10 MILLIGRAM(S): 5 TABLET ORAL at 00:43

## 2019-10-07 RX ADMIN — Medication 325 MILLIGRAM(S): at 17:28

## 2019-10-07 NOTE — PROGRESS NOTE ADULT - ASSESSMENT
Assessment: s/p Open Reduction Internal Fixation / Surgical Repair L [place]-prosthetic Fx    Plan:   PT: LLE TTWB  Ice L hip  Pain control  IS  reg diet  Aspirin  MICK

## 2019-10-07 NOTE — PROGRESS NOTE ADULT - ATTENDING COMMENTS
Patient scheduled for DC planning to rehab  continue current meds  PO as tolerated  activity as tolerated  follow up with ortho  Patient and family aware of plan

## 2019-10-07 NOTE — PROGRESS NOTE ADULT - SUBJECTIVE AND OBJECTIVE BOX
Patient is a 79y old  Male who presents with a chief complaint of Left periprosthetic hip fracture (04 Oct 2019 19:58)      POST OPERATIVE DAY #:  [5 ]   Patient comfortable in NAD  No complaints    Vital Signs Last 24 Hrs  T(C): 37 (07 Oct 2019 04:46), Max: 37.6 (06 Oct 2019 20:07)  T(F): 98.6 (07 Oct 2019 04:46), Max: 99.7 (06 Oct 2019 20:07)  HR: 74 (07 Oct 2019 04:46) (71 - 90)  BP: 116/86 (07 Oct 2019 04:46) (102/65 - 131/69)  BP(mean): --  RR: 16 (07 Oct 2019 04:46) (16 - 16)  SpO2: 96% (07 Oct 2019 04:46) (96% - 99%)      PHYSICAL EXAM:  NAD, Alert  EXT:   LLE  [x ] Aquacel Dressing  C/D  (+) surrounding ecchymosis & swelling noted  No Calf Tenderness  (+) DF/PF; (+) Distal Pulses;   SILT SP/DP/T/Briggs/Sap      LABS:                                   8.4    9.02  )-----------( 224      ( 06 Oct 2019 06:42 )             24.6       10-06    132<L>  |  99  |  10  ----------------------------<  101<H>  4.3   |  26  |  0.56    Ca    8.8      06 Oct 2019 06:42

## 2019-10-07 NOTE — PROGRESS NOTE ADULT - SUBJECTIVE AND OBJECTIVE BOX
The patient was seen and examined tonight after an accidental fall with a left periprosthetic hip fracture that requires orthopedic ORIF. His comorbidities included inactive ASHD with stent 2010 and no post procedural symptoms, controlled hypercholesterolemia with medication, BPH ( possibly Ca. ) S/P cryosurgery 06/10/19, controlled hypertension, asbestosis, sleep apnea treated with CPAPPatient is a 79y old  Male who presents with a chief complaint of Left periprosthetic hip fracture Patient seen s/p Open reduction and internal fixation of left periprostatic fx patient tolerated procedure well.  Patient states pain has been well controlled. thigh swelling is slowly improving    MEDICATIONS  (STANDING):  ascorbic acid 500 milliGRAM(s) Oral two times a day  aspirin enteric coated 325 milliGRAM(s) Oral two times a day  calcium carbonate 1250 mG  + Vitamin D (OsCal 500 + D) 1 Tablet(s) Oral three times a day  docusate sodium 100 milliGRAM(s) Oral three times a day  ferrous    sulfate 325 milliGRAM(s) Oral three times a day with meals  folic acid 1 milliGRAM(s) Oral daily  lactated ringers. 1000 milliLiter(s) (75 mL/Hr) IV Continuous <Continuous>  lisinopril 10 milliGRAM(s) Oral daily  melatonin 3 milliGRAM(s) Oral at bedtime  multivitamin 1 Tablet(s) Oral daily  pantoprazole    Tablet 40 milliGRAM(s) Oral before breakfast  polyethylene glycol 3350 17 Gram(s) Oral daily  simvastatin 40 milliGRAM(s) Oral at bedtime    MEDICATIONS  (PRN):  aluminum hydroxide/magnesium hydroxide/simethicone Suspension 30 milliLiter(s) Oral four times a day PRN Indigestion  bisacodyl Suppository 10 milliGRAM(s) Rectal daily PRN If no bowel movement by POD#2  diphenhydrAMINE 25 milliGRAM(s) Oral every 4 hours PRN Rash and/or Itching  magnesium hydroxide Suspension 30 milliLiter(s) Oral daily PRN Constipation  ondansetron Injectable 4 milliGRAM(s) IV Push every 6 hours PRN Nausea and/or Vomiting  oxyCODONE    IR 5 milliGRAM(s) Oral every 4 hours PRN Moderate Pain (4 - 6)  oxyCODONE    IR 10 milliGRAM(s) Oral every 4 hours PRN Severe Pain (7 - 10)  senna 2 Tablet(s) Oral at bedtime PRN Constipation  traMADol 50 milliGRAM(s) Oral every 6 hours PRN Mild Pain (1 - 3)          VITALS:   T(C): 36.7 (10-07-19 @ 07:35), Max: 37.6 (10-06-19 @ 20:07)  HR: 76 (10-07-19 @ 07:35) (74 - 90)  BP: 99/56 (10-07-19 @ 07:35) (99/56 - 131/69)  RR: 18 (10-07-19 @ 07:35) (16 - 18)  SpO2: 96% (10-07-19 @ 07:35) (96% - 99%)  Wt(kg): --    PHYSICAL EXAM:  GENERAL: NAD, well nourished and conversant  HEAD:  Atraumatic  EYES: EOM, PERRLA, conjunctiva pink and sclera white  ENT: No tonsillar erythema, exudates, or enlargement, moist mucous membranes, good dentition, no lesions  NECK: Supple, No JVD, normal thyroid, carotids with normal upstrokes and no bruits  CHEST/LUNG: Clear to auscultation bilaterally, No rales, rhonchi, wheezing, or rubs  HEART: Regular rate and rhythm, No murmurs, rubs, or gallops  ABDOMEN: Soft, nondistended, no masses, guarding, tenderness or rebound, bowel sounds present  EXTREMITIES:  2+ Peripheral Pulses, swelling of left thigh  LYMPH: No lymphadenopathy noted  SKIN: No rashes or lesions  NERVOUS SYSTEM:  Alert & Oriented X3,. Motor Strength 5/5 right upper and right lower.      LABS:        CBC Full  -  ( 06 Oct 2019 06:42 )  WBC Count : 9.02 K/uL  RBC Count : 2.46 M/uL  Hemoglobin : 8.4 g/dL  Hematocrit : 24.6 %  Platelet Count - Automated : 224 K/uL  Mean Cell Volume : 100.0 fl  Mean Cell Hemoglobin : 34.1 pg  Mean Cell Hemoglobin Concentration : 34.1 gm/dL  Auto Neutrophil # : x  Auto Lymphocyte # : x  Auto Monocyte # : x  Auto Eosinophil # : x  Auto Basophil # : x  Auto Neutrophil % : x  Auto Lymphocyte % : x  Auto Monocyte % : x  Auto Eosinophil % : x  Auto Basophil % : x    10-06    132<L>  |  99  |  10  ----------------------------<  101<H>  4.3   |  26  |  0.56    Ca    8.8      06 Oct 2019 06:42            CAPILLARY BLOOD GLUCOSE          RADIOLOGY & ADDITIONAL TESTS:

## 2019-10-08 LAB — SURGICAL PATHOLOGY STUDY: SIGNIFICANT CHANGE UP

## 2019-10-08 PROCEDURE — 72170 X-RAY EXAM OF PELVIS: CPT | Mod: 26

## 2019-10-08 RX ADMIN — OXYCODONE HYDROCHLORIDE 10 MILLIGRAM(S): 5 TABLET ORAL at 22:20

## 2019-10-08 RX ADMIN — OXYCODONE HYDROCHLORIDE 10 MILLIGRAM(S): 5 TABLET ORAL at 21:50

## 2019-10-08 RX ADMIN — Medication 325 MILLIGRAM(S): at 17:49

## 2019-10-08 RX ADMIN — Medication 325 MILLIGRAM(S): at 11:47

## 2019-10-08 RX ADMIN — Medication 500 MILLIGRAM(S): at 06:11

## 2019-10-08 RX ADMIN — Medication 500 MILLIGRAM(S): at 17:49

## 2019-10-08 RX ADMIN — LISINOPRIL 10 MILLIGRAM(S): 2.5 TABLET ORAL at 06:11

## 2019-10-08 RX ADMIN — Medication 100 MILLIGRAM(S): at 06:11

## 2019-10-08 RX ADMIN — PANTOPRAZOLE SODIUM 40 MILLIGRAM(S): 20 TABLET, DELAYED RELEASE ORAL at 06:11

## 2019-10-08 RX ADMIN — Medication 325 MILLIGRAM(S): at 06:12

## 2019-10-08 RX ADMIN — Medication 1 TABLET(S): at 21:48

## 2019-10-08 RX ADMIN — POLYETHYLENE GLYCOL 3350 17 GRAM(S): 17 POWDER, FOR SOLUTION ORAL at 11:47

## 2019-10-08 RX ADMIN — Medication 3 MILLIGRAM(S): at 21:48

## 2019-10-08 RX ADMIN — OXYCODONE HYDROCHLORIDE 10 MILLIGRAM(S): 5 TABLET ORAL at 10:57

## 2019-10-08 RX ADMIN — SIMVASTATIN 40 MILLIGRAM(S): 20 TABLET, FILM COATED ORAL at 21:48

## 2019-10-08 RX ADMIN — Medication 1 TABLET(S): at 13:47

## 2019-10-08 RX ADMIN — Medication 100 MILLIGRAM(S): at 21:48

## 2019-10-08 RX ADMIN — OXYCODONE HYDROCHLORIDE 10 MILLIGRAM(S): 5 TABLET ORAL at 11:30

## 2019-10-08 RX ADMIN — Medication 1 TABLET(S): at 06:10

## 2019-10-08 RX ADMIN — OXYCODONE HYDROCHLORIDE 10 MILLIGRAM(S): 5 TABLET ORAL at 16:45

## 2019-10-08 RX ADMIN — Medication 1 MILLIGRAM(S): at 11:47

## 2019-10-08 RX ADMIN — OXYCODONE HYDROCHLORIDE 10 MILLIGRAM(S): 5 TABLET ORAL at 16:15

## 2019-10-08 RX ADMIN — Medication 100 MILLIGRAM(S): at 13:47

## 2019-10-08 RX ADMIN — Medication 1 TABLET(S): at 11:47

## 2019-10-08 NOTE — PROGRESS NOTE ADULT - SUBJECTIVE AND OBJECTIVE BOX
The patient was seen and examined tonight after an accidental fall with a left periprosthetic hip fracture that requires orthopedic ORIF. His comorbidities included inactive ASHD with stent 2010 and no post procedural symptoms, controlled hypercholesterolemia with medication, BPH ( possibly Ca. ) S/P cryosurgery 06/10/19, controlled hypertension, asbestosis, sleep apnea treated with CPAPPatient is a 79y old  Male who presents with a chief complaint of Left periprosthetic hip fracture Patient seen s/p Open reduction and internal fixation of left periprostatic fx patient tolerated procedure well.  Patient states pain has been well controlled. thigh swelling is slowly improving    MEDICATIONS  (STANDING):  ascorbic acid 500 milliGRAM(s) Oral two times a day  aspirin enteric coated 325 milliGRAM(s) Oral two times a day  calcium carbonate 1250 mG  + Vitamin D (OsCal 500 + D) 1 Tablet(s) Oral three times a day  docusate sodium 100 milliGRAM(s) Oral three times a day  ferrous    sulfate 325 milliGRAM(s) Oral three times a day with meals  folic acid 1 milliGRAM(s) Oral daily  lactated ringers. 1000 milliLiter(s) (75 mL/Hr) IV Continuous <Continuous>  lisinopril 10 milliGRAM(s) Oral daily  melatonin 3 milliGRAM(s) Oral at bedtime  multivitamin 1 Tablet(s) Oral daily  pantoprazole    Tablet 40 milliGRAM(s) Oral before breakfast  polyethylene glycol 3350 17 Gram(s) Oral daily  simvastatin 40 milliGRAM(s) Oral at bedtime  sodium chloride 0.9%. 1000 milliLiter(s) (100 mL/Hr) IV Continuous <Continuous>    MEDICATIONS  (PRN):  aluminum hydroxide/magnesium hydroxide/simethicone Suspension 30 milliLiter(s) Oral four times a day PRN Indigestion  bisacodyl Suppository 10 milliGRAM(s) Rectal daily PRN If no bowel movement by POD#2  diphenhydrAMINE 25 milliGRAM(s) Oral every 4 hours PRN Rash and/or Itching  magnesium hydroxide Suspension 30 milliLiter(s) Oral daily PRN Constipation  ondansetron Injectable 4 milliGRAM(s) IV Push every 6 hours PRN Nausea and/or Vomiting  oxyCODONE    IR 5 milliGRAM(s) Oral every 4 hours PRN Moderate Pain (4 - 6)  oxyCODONE    IR 10 milliGRAM(s) Oral every 4 hours PRN Severe Pain (7 - 10)  senna 2 Tablet(s) Oral at bedtime PRN Constipation  traMADol 50 milliGRAM(s) Oral every 6 hours PRN Mild Pain (1 - 3)          VITALS:   T(C): 36.6 (10-08-19 @ 14:20), Max: 37.6 (10-08-19 @ 00:25)  HR: 74 (10-08-19 @ 14:20) (74 - 87)  BP: 110/62 (10-08-19 @ 14:20) (110/62 - 133/63)  RR: 18 (10-08-19 @ 14:20) (18 - 18)  SpO2: 95% (10-08-19 @ 14:20) (94% - 96%)  Wt(kg): --      PHYSICAL EXAM:  GENERAL: NAD, well nourished and conversant  HEAD:  Atraumatic  EYES: EOM, PERRLA, conjunctiva pink and sclera white  ENT: No tonsillar erythema, exudates, or enlargement, moist mucous membranes, good dentition, no lesions  NECK: Supple, No JVD, normal thyroid, carotids with normal upstrokes and no bruits  CHEST/LUNG: Clear to auscultation bilaterally, No rales, rhonchi, wheezing, or rubs  HEART: Regular rate and rhythm, No murmurs, rubs, or gallops  ABDOMEN: Soft, nondistended, no masses, guarding, tenderness or rebound, bowel sounds present  EXTREMITIES:  2+ Peripheral Pulses, swelling of left thigh  LYMPH: No lymphadenopathy noted  SKIN: No rashes or lesions  NERVOUS SYSTEM:  Alert & Oriented X3,. Motor Strength 5/5 right upper and right lower.        LABS:                      CAPILLARY BLOOD GLUCOSE          RADIOLOGY & ADDITIONAL TESTS:

## 2019-10-09 ENCOUNTER — TRANSCRIPTION ENCOUNTER (OUTPATIENT)
Age: 79
End: 2019-10-09

## 2019-10-09 VITALS
DIASTOLIC BLOOD PRESSURE: 62 MMHG | TEMPERATURE: 98 F | HEART RATE: 80 BPM | OXYGEN SATURATION: 94 % | RESPIRATION RATE: 18 BRPM | SYSTOLIC BLOOD PRESSURE: 118 MMHG

## 2019-10-09 RX ADMIN — Medication 1 TABLET(S): at 12:29

## 2019-10-09 RX ADMIN — LISINOPRIL 10 MILLIGRAM(S): 2.5 TABLET ORAL at 07:03

## 2019-10-09 RX ADMIN — Medication 1 TABLET(S): at 07:03

## 2019-10-09 RX ADMIN — Medication 100 MILLIGRAM(S): at 07:03

## 2019-10-09 RX ADMIN — Medication 500 MILLIGRAM(S): at 07:03

## 2019-10-09 RX ADMIN — OXYCODONE HYDROCHLORIDE 5 MILLIGRAM(S): 5 TABLET ORAL at 07:30

## 2019-10-09 RX ADMIN — Medication 325 MILLIGRAM(S): at 07:03

## 2019-10-09 RX ADMIN — OXYCODONE HYDROCHLORIDE 5 MILLIGRAM(S): 5 TABLET ORAL at 07:04

## 2019-10-09 RX ADMIN — Medication 325 MILLIGRAM(S): at 12:29

## 2019-10-09 RX ADMIN — Medication 1 MILLIGRAM(S): at 12:29

## 2019-10-09 RX ADMIN — Medication 1 TABLET(S): at 14:53

## 2019-10-09 RX ADMIN — PANTOPRAZOLE SODIUM 40 MILLIGRAM(S): 20 TABLET, DELAYED RELEASE ORAL at 07:03

## 2019-10-09 RX ADMIN — Medication 325 MILLIGRAM(S): at 08:52

## 2019-10-09 NOTE — PROGRESS NOTE ADULT - ASSESSMENT
Impression: Stable       Plan:   Continue present treatment                 Out of bed, ambulate, toe touch weight bearing                                Physical therapy follow up                 Continue to monitor    David Camp PA-C  Orthopaedic Surgery  Team pager [phone]  [phone]

## 2019-10-09 NOTE — PROGRESS NOTE ADULT - SUBJECTIVE AND OBJECTIVE BOX
ORTHO  Patient is a 79y old  Male who presents with a chief complaint of Left periprosthetic hip fracture (08 Oct 2019 17:07)    Pt. resting without complaint    VS-  T(C): 36.8 (10-09-19 @ 05:50), Max: 37.3 (10-08-19 @ 23:42)  HR: 75 (10-09-19 @ 05:50) (74 - 82)  BP: 126/62 (10-09-19 @ 05:50) (106/64 - 128/68)  RR: 18 (10-09-19 @ 05:50) (18 - 18)  SpO2: 94% (10-09-19 @ 05:50) (94% - 96%)  Wt(kg): --    M.S. Alert  Extremity- Left LE- aqua cell dressing C/D/I  Neuro-              Motor- (+) ankle DF/PF              Sensation- grossly intact to light touch              Calves- soft, nontender- PAS

## 2019-10-09 NOTE — PROGRESS NOTE ADULT - REASON FOR ADMISSION
Left periprosthetic hip fracture

## 2019-10-09 NOTE — DISCHARGE NOTE NURSING/CASE MANAGEMENT/SOCIAL WORK - NSDCFUADDAPPT_GEN_ALL_CORE_FT
Patient to follow up with Dr. Corral once discharged from [facility] for follow up care.  Please call [phone] to schedule appointment.    Please follow up with your primary care provider within one month of discharge from rehab for continuum of care.

## 2019-10-09 NOTE — PROGRESS NOTE ADULT - NSHPATTENDINGPLANDISCUSS_GEN_ALL_CORE
Patient and staff
Patient and wife
Patient and staff

## 2019-10-09 NOTE — PROGRESS NOTE ADULT - PROBLEM SELECTOR PROBLEM 2
Periprosthetic fracture around internal prosthetic joint, sequela
Essential hypertension

## 2019-10-09 NOTE — DISCHARGE NOTE NURSING/CASE MANAGEMENT/SOCIAL WORK - PATIENT PORTAL LINK FT
You can access the FollowMyHealth Patient Portal offered by [facility] by registering at the following website: [email or web address]. By joining [facility]’s FollowMyHealth portal, you will also be able to view your health information using other applications (apps) compatible with our system.

## 2019-10-09 NOTE — PROGRESS NOTE ADULT - PROBLEM SELECTOR PLAN 2
***See Above  Jimmie FLOR  Orthopedics  B: [phone]  S: [phone]
continue lisinopril  adjust as needed
continue lisinopril  adjust as needed  BP has been well controlled

## 2019-10-17 PROBLEM — Z00.00 ENCOUNTER FOR PREVENTIVE HEALTH EXAMINATION: Status: ACTIVE | Noted: 2019-10-17

## 2019-10-21 ENCOUNTER — APPOINTMENT (OUTPATIENT)
Dept: ORTHOPEDIC SURGERY | Facility: CLINIC | Age: 79
End: 2019-10-21
Payer: MEDICARE

## 2019-10-21 DIAGNOSIS — Z96.642 PRESENCE OF LEFT ARTIFICIAL HIP JOINT: ICD-10-CM

## 2019-10-21 PROCEDURE — 73552 X-RAY EXAM OF FEMUR 2/>: CPT | Mod: LT

## 2019-10-21 PROCEDURE — 73502 X-RAY EXAM HIP UNI 2-3 VIEWS: CPT | Mod: LT

## 2019-10-21 PROCEDURE — 99024 POSTOP FOLLOW-UP VISIT: CPT

## 2019-11-12 PROCEDURE — 93005 ELECTROCARDIOGRAM TRACING: CPT

## 2019-11-12 PROCEDURE — 73502 X-RAY EXAM HIP UNI 2-3 VIEWS: CPT

## 2019-11-12 PROCEDURE — 84153 ASSAY OF PSA TOTAL: CPT

## 2019-11-12 PROCEDURE — 97162 PT EVAL MOD COMPLEX 30 MIN: CPT

## 2019-11-12 PROCEDURE — 86923 COMPATIBILITY TEST ELECTRIC: CPT

## 2019-11-12 PROCEDURE — 86901 BLOOD TYPING SEROLOGIC RH(D): CPT

## 2019-11-12 PROCEDURE — C1769: CPT

## 2019-11-12 PROCEDURE — 36430 TRANSFUSION BLD/BLD COMPNT: CPT

## 2019-11-12 PROCEDURE — 81003 URINALYSIS AUTO W/O SCOPE: CPT

## 2019-11-12 PROCEDURE — 84132 ASSAY OF SERUM POTASSIUM: CPT

## 2019-11-12 PROCEDURE — C1776: CPT

## 2019-11-12 PROCEDURE — 86850 RBC ANTIBODY SCREEN: CPT

## 2019-11-12 PROCEDURE — 71045 X-RAY EXAM CHEST 1 VIEW: CPT

## 2019-11-12 PROCEDURE — 85610 PROTHROMBIN TIME: CPT

## 2019-11-12 PROCEDURE — 82330 ASSAY OF CALCIUM: CPT

## 2019-11-12 PROCEDURE — 80048 BASIC METABOLIC PNL TOTAL CA: CPT

## 2019-11-12 PROCEDURE — 73551 X-RAY EXAM OF FEMUR 1: CPT

## 2019-11-12 PROCEDURE — 82435 ASSAY OF BLOOD CHLORIDE: CPT

## 2019-11-12 PROCEDURE — 85027 COMPLETE CBC AUTOMATED: CPT

## 2019-11-12 PROCEDURE — 82947 ASSAY GLUCOSE BLOOD QUANT: CPT

## 2019-11-12 PROCEDURE — 88300 SURGICAL PATH GROSS: CPT

## 2019-11-12 PROCEDURE — 83605 ASSAY OF LACTIC ACID: CPT

## 2019-11-12 PROCEDURE — 85014 HEMATOCRIT: CPT

## 2019-11-12 PROCEDURE — 82803 BLOOD GASES ANY COMBINATION: CPT

## 2019-11-12 PROCEDURE — 97166 OT EVAL MOD COMPLEX 45 MIN: CPT

## 2019-11-12 PROCEDURE — 72170 X-RAY EXAM OF PELVIS: CPT

## 2019-11-12 PROCEDURE — 84295 ASSAY OF SERUM SODIUM: CPT

## 2019-11-12 PROCEDURE — 84154 ASSAY OF PSA FREE: CPT

## 2019-11-12 PROCEDURE — 76377 3D RENDER W/INTRP POSTPROCES: CPT

## 2019-11-12 PROCEDURE — 73552 X-RAY EXAM OF FEMUR 2/>: CPT

## 2019-11-12 PROCEDURE — 82565 ASSAY OF CREATININE: CPT

## 2019-11-12 PROCEDURE — 73501 X-RAY EXAM HIP UNI 1 VIEW: CPT

## 2019-11-12 PROCEDURE — 86900 BLOOD TYPING SEROLOGIC ABO: CPT

## 2019-11-12 PROCEDURE — 73700 CT LOWER EXTREMITY W/O DYE: CPT

## 2019-11-12 PROCEDURE — C1713: CPT

## 2019-11-12 PROCEDURE — P9016: CPT

## 2019-11-12 PROCEDURE — 85730 THROMBOPLASTIN TIME PARTIAL: CPT

## 2019-11-12 PROCEDURE — 97110 THERAPEUTIC EXERCISES: CPT

## 2019-11-12 PROCEDURE — 97116 GAIT TRAINING THERAPY: CPT

## 2019-11-12 PROCEDURE — C1889: CPT

## 2019-11-12 PROCEDURE — 97530 THERAPEUTIC ACTIVITIES: CPT

## 2019-11-12 PROCEDURE — 73560 X-RAY EXAM OF KNEE 1 OR 2: CPT

## 2020-09-23 NOTE — PROGRESS NOTE ADULT - PROBLEM/PLAN-1
DISPLAY PLAN FREE TEXT Detail Level: Detailed Quality 130: Documentation Of Current Medications In The Medical Record: Current Medications Documented Quality 431: Preventive Care And Screening: Unhealthy Alcohol Use - Screening: Patient screened for unhealthy alcohol use using a single question and scores less than 2 times per year Quality 226: Preventive Care And Screening: Tobacco Use: Screening And Cessation Intervention: Patient screened for tobacco use and is an ex/non-smoker

## 2022-09-26 NOTE — PATIENT PROFILE ADULT - STATED REASON FOR ADMISSION
direct admit frx femur no hematuria/no bladder infections/no incontinence/no dysuria/no urinary hesitancy

## 2023-04-14 NOTE — PATIENT PROFILE ADULT - NSPROSPIRITUALVALUESFT_GEN_A_NUR
[FreeTextEntry1] : The patient is 73 yo RHD male who presents for hand evaluation.\par Patient states that he was referred by his PCP, Ernie Maddox MD.\par Pt retired , [employer], [facility] [employer].\par \par Pt reports "numbing in my fingers" for 6-8 months.\par Patient reports that the numbing in left hand is "constant", and that it can be worse during night or in AM.\par Patient reports that in general symptoms are not notably worse during day\par Patient reports that in right hand he also has numbness in fingers that occurred intermittently, occ worse at night.\par Symptoms on the right began 6-8 months ago and were initially not as bad as on the left, \par Patient reports that over the past few weeks symptoms on the Rt have become much worse, presumably because of doing yard work at home\par \par Patient denies triggering.\par Patient has no other notable hand complaints for which he seeks treatment today.\par \par Notable past history:\par 1.  Small stroke\par 2.  Tobacco use\par 3.  Diabetes No